# Patient Record
Sex: FEMALE | Race: WHITE | NOT HISPANIC OR LATINO | Employment: OTHER | URBAN - METROPOLITAN AREA
[De-identification: names, ages, dates, MRNs, and addresses within clinical notes are randomized per-mention and may not be internally consistent; named-entity substitution may affect disease eponyms.]

---

## 2023-04-30 PROBLEM — M81.8 IDIOPATHIC OSTEOPOROSIS: Status: ACTIVE | Noted: 2023-04-30

## 2023-04-30 PROBLEM — S22.080A COMPRESSION FRACTURE OF T11 VERTEBRA (MULTI): Status: ACTIVE | Noted: 2023-04-30

## 2023-04-30 PROBLEM — H91.90 HEARING LOSS: Status: ACTIVE | Noted: 2023-04-30

## 2023-04-30 PROBLEM — H25.813 COMBINED FORM OF SENILE CATARACT OF BOTH EYES: Status: ACTIVE | Noted: 2023-04-30

## 2023-04-30 PROBLEM — I10 BENIGN ESSENTIAL HYPERTENSION: Status: ACTIVE | Noted: 2023-04-30

## 2023-04-30 PROBLEM — M48.061 LUMBAR STENOSIS: Status: ACTIVE | Noted: 2023-04-30

## 2023-04-30 PROBLEM — M16.12 PRIMARY OSTEOARTHRITIS OF LEFT HIP: Status: ACTIVE | Noted: 2023-04-30

## 2023-04-30 PROBLEM — S72.143A CLOSED INTERTROCHANTERIC FRACTURE OF FEMUR (MULTI): Status: ACTIVE | Noted: 2023-04-30

## 2023-04-30 PROBLEM — M85.80 LOW BONE MASS: Status: ACTIVE | Noted: 2023-04-30

## 2023-04-30 PROBLEM — M17.9 DJD (DEGENERATIVE JOINT DISEASE) OF KNEE: Status: ACTIVE | Noted: 2023-04-30

## 2023-04-30 PROBLEM — E78.5 HYPERLIPIDEMIA: Status: ACTIVE | Noted: 2023-04-30

## 2023-04-30 PROBLEM — K57.92 DIVERTICULITIS OF INTESTINE WITHOUT PERFORATION OR ABSCESS WITHOUT BLEEDING: Status: ACTIVE | Noted: 2023-04-30

## 2023-04-30 PROBLEM — K58.9 IRRITABLE BOWEL SYNDROME: Status: ACTIVE | Noted: 2023-04-30

## 2023-04-30 RX ORDER — TRIAMTERENE AND HYDROCHLOROTHIAZIDE 75; 50 MG/1; MG/1
1 TABLET ORAL DAILY
COMMUNITY
Start: 2022-07-19 | End: 2023-10-30

## 2023-04-30 RX ORDER — CALCIUM CARBONATE/VITAMIN D3 500 MG-10
TABLET,CHEWABLE ORAL
COMMUNITY
End: 2024-04-17 | Stop reason: ALTCHOICE

## 2023-04-30 RX ORDER — ATORVASTATIN CALCIUM 10 MG/1
10 TABLET, FILM COATED ORAL DAILY
COMMUNITY
Start: 2013-11-26 | End: 2023-12-29

## 2023-04-30 RX ORDER — OMEPRAZOLE 40 MG/1
1 CAPSULE, DELAYED RELEASE ORAL DAILY
COMMUNITY
Start: 2021-07-15 | End: 2024-02-13

## 2023-04-30 RX ORDER — METOPROLOL SUCCINATE 25 MG/1
12.5 TABLET, EXTENDED RELEASE ORAL DAILY
COMMUNITY
Start: 2022-05-22 | End: 2023-11-20 | Stop reason: SDUPTHER

## 2023-04-30 RX ORDER — ASPIRIN 81 MG/1
81 TABLET ORAL DAILY
COMMUNITY
Start: 2022-04-06 | End: 2023-11-01 | Stop reason: SDUPTHER

## 2023-04-30 RX ORDER — LOSARTAN POTASSIUM 25 MG/1
25 TABLET ORAL DAILY
COMMUNITY
Start: 2017-05-09 | End: 2023-12-29

## 2023-04-30 RX ORDER — ALENDRONATE SODIUM 70 MG/1
70 TABLET ORAL WEEKLY
COMMUNITY
Start: 2022-07-26 | End: 2023-08-19 | Stop reason: SDUPTHER

## 2023-05-01 ENCOUNTER — OFFICE VISIT (OUTPATIENT)
Dept: PRIMARY CARE | Facility: CLINIC | Age: 83
End: 2023-05-01
Payer: MEDICARE

## 2023-05-01 VITALS
DIASTOLIC BLOOD PRESSURE: 84 MMHG | WEIGHT: 160 LBS | SYSTOLIC BLOOD PRESSURE: 136 MMHG | BODY MASS INDEX: 32.32 KG/M2 | HEART RATE: 72 BPM

## 2023-05-01 DIAGNOSIS — Z12.31 SCREENING MAMMOGRAM, ENCOUNTER FOR: ICD-10-CM

## 2023-05-01 DIAGNOSIS — M81.8 IDIOPATHIC OSTEOPOROSIS: ICD-10-CM

## 2023-05-01 DIAGNOSIS — R53.81 MALAISE: ICD-10-CM

## 2023-05-01 DIAGNOSIS — K58.8 OTHER IRRITABLE BOWEL SYNDROME: ICD-10-CM

## 2023-05-01 DIAGNOSIS — E78.2 MIXED HYPERLIPIDEMIA: ICD-10-CM

## 2023-05-01 DIAGNOSIS — I10 BENIGN ESSENTIAL HYPERTENSION: ICD-10-CM

## 2023-05-01 DIAGNOSIS — I27.20 PULMONARY HYPERTENSION (MULTI): ICD-10-CM

## 2023-05-01 DIAGNOSIS — E55.9 VITAMIN D DEFICIENCY: ICD-10-CM

## 2023-05-01 DIAGNOSIS — M48.061 SPINAL STENOSIS OF LUMBAR REGION WITHOUT NEUROGENIC CLAUDICATION: Primary | ICD-10-CM

## 2023-05-01 PROCEDURE — 3079F DIAST BP 80-89 MM HG: CPT | Performed by: INTERNAL MEDICINE

## 2023-05-01 PROCEDURE — 99213 OFFICE O/P EST LOW 20 MIN: CPT | Performed by: INTERNAL MEDICINE

## 2023-05-01 PROCEDURE — 3075F SYST BP GE 130 - 139MM HG: CPT | Performed by: INTERNAL MEDICINE

## 2023-05-01 PROCEDURE — 1160F RVW MEDS BY RX/DR IN RCRD: CPT | Performed by: INTERNAL MEDICINE

## 2023-05-01 PROCEDURE — 1036F TOBACCO NON-USER: CPT | Performed by: INTERNAL MEDICINE

## 2023-05-01 PROCEDURE — 1159F MED LIST DOCD IN RCRD: CPT | Performed by: INTERNAL MEDICINE

## 2023-05-01 RX ORDER — MELOXICAM 7.5 MG/1
7.5 TABLET ORAL DAILY
COMMUNITY
Start: 2023-04-18 | End: 2023-05-01 | Stop reason: SDUPTHER

## 2023-05-01 RX ORDER — CHOLECALCIFEROL (VITAMIN D3) 25 MCG
50 TABLET ORAL DAILY
COMMUNITY
Start: 2021-12-31

## 2023-05-01 RX ORDER — MELOXICAM 7.5 MG/1
7.5 TABLET ORAL DAILY
Qty: 30 TABLET | Refills: 3 | Status: SHIPPED | OUTPATIENT
Start: 2023-05-01 | End: 2024-02-02 | Stop reason: SDUPTHER

## 2023-05-01 SDOH — HEALTH STABILITY: PHYSICAL HEALTH: ON AVERAGE, HOW MANY DAYS PER WEEK DO YOU ENGAGE IN MODERATE TO STRENUOUS EXERCISE (LIKE A BRISK WALK)?: 7 DAYS

## 2023-05-01 SDOH — HEALTH STABILITY: PHYSICAL HEALTH: ON AVERAGE, HOW MANY MINUTES DO YOU ENGAGE IN EXERCISE AT THIS LEVEL?: 20 MIN

## 2023-05-01 ASSESSMENT — LIFESTYLE VARIABLES
HOW OFTEN DO YOU HAVE SIX OR MORE DRINKS ON ONE OCCASION: NEVER
HOW MANY STANDARD DRINKS CONTAINING ALCOHOL DO YOU HAVE ON A TYPICAL DAY: 1 OR 2
AUDIT-C TOTAL SCORE: 2
SKIP TO QUESTIONS 9-10: 1
HOW OFTEN DO YOU HAVE A DRINK CONTAINING ALCOHOL: 2-4 TIMES A MONTH

## 2023-05-01 ASSESSMENT — PATIENT HEALTH QUESTIONNAIRE - PHQ9
SUM OF ALL RESPONSES TO PHQ9 QUESTIONS 1 & 2: 0
1. LITTLE INTEREST OR PLEASURE IN DOING THINGS: NOT AT ALL
2. FEELING DOWN, DEPRESSED OR HOPELESS: NOT AT ALL

## 2023-05-01 ASSESSMENT — ENCOUNTER SYMPTOMS
BACK PAIN: 1
ARTHRALGIAS: 1

## 2023-05-01 NOTE — PROGRESS NOTES
Subjective   Patient ID: Shila Venegas is a 82 y.o. female who presents for Medicare Annual Wellness Visit Subsequent.    Patient presents for follow-up.  She has been compliant with her medications, and exercise.  Patient reports baseline back and knee pain.  She was treated for UTI.  She denies any headaches, no dizziness, no sinus problems, no chest pain or shortness of breath.  She denies abdominal pain no nausea vomiting or diarrhea.  She reports no other new musculoskeletal complaints.         Review of Systems   Musculoskeletal:  Positive for arthralgias and back pain.       Objective   /84   Pulse 72   Wt 72.6 kg (160 lb)   BMI 32.32 kg/m²     Physical Exam  Constitutional:       Appearance: Normal appearance.   Cardiovascular:      Rate and Rhythm: Normal rate and regular rhythm.      Heart sounds: No murmur heard.     No gallop.   Pulmonary:      Effort: No respiratory distress.      Breath sounds: No wheezing or rales.   Abdominal:      General: There is no distension.      Palpations: There is no mass.      Tenderness: There is no abdominal tenderness. There is no guarding.   Musculoskeletal:      Right lower leg: No edema.      Left lower leg: No edema.   Neurological:      Mental Status: She is alert.         Assessment/Plan   Diagnoses and all orders for this visit:  Spinal stenosis of lumbar region without neurogenic claudication-stable symptoms-  Benign essential hypertension she will follow a low-salt diet and exercise  -     Uric Acid; Future  -     Urinalysis with Reflex Microscopic; Future  -     Albumin , Urine Random; Future  -     Comprehensive Metabolic Panel; Future  Other irritable bowel syndrome  Idiopathic osteoporosis-we will continue with Fosamax-we will check a fast lipid profile.  Mixed hyperlipidemia  -     Lipid Panel; Future  Pulmonary hypertension (CMS/HCC)-she denies any symptoms  Health maintenance-colonoscopy has been done.  Malaise  -     CBC and Auto Differential;  Future  -     TSH with reflex to Free T4 if abnormal; Future  Vitamin D deficiency  -     Vitamin D, Total; Future  Screening mammogram, encounter for  -     BI mammo bilateral screening tomosynthesis; Future  Health maintenance-colonoscopy has been done.  Immunizations are up-to-date.  Bone density has been done.  She will schedule her mammogram

## 2023-05-01 NOTE — PATIENT INSTRUCTIONS
Please schedule mammogram.  Obtain fasting blood work and urine.  Diet and exercise.  Follow-up in 3 months.

## 2023-06-06 ENCOUNTER — LAB (OUTPATIENT)
Dept: LAB | Facility: LAB | Age: 83
End: 2023-06-06
Payer: MEDICARE

## 2023-06-06 DIAGNOSIS — E78.2 MIXED HYPERLIPIDEMIA: ICD-10-CM

## 2023-06-06 DIAGNOSIS — I10 BENIGN ESSENTIAL HYPERTENSION: ICD-10-CM

## 2023-06-06 DIAGNOSIS — E55.9 VITAMIN D DEFICIENCY: ICD-10-CM

## 2023-06-06 DIAGNOSIS — R53.81 MALAISE: ICD-10-CM

## 2023-06-06 LAB
ALANINE AMINOTRANSFERASE (SGPT) (U/L) IN SER/PLAS: 8 U/L (ref 7–45)
ALBUMIN (G/DL) IN SER/PLAS: 4.2 G/DL (ref 3.4–5)
ALBUMIN (MG/L) IN URINE: 10.8 MG/L
ALBUMIN/CREATININE (UG/MG) IN URINE: 11.7 UG/MG CRT (ref 0–30)
ALKALINE PHOSPHATASE (U/L) IN SER/PLAS: 77 U/L (ref 33–136)
ANION GAP IN SER/PLAS: 14 MMOL/L (ref 10–20)
APPEARANCE, URINE: CLEAR
ASPARTATE AMINOTRANSFERASE (SGOT) (U/L) IN SER/PLAS: 13 U/L (ref 9–39)
BASOPHILS (10*3/UL) IN BLOOD BY AUTOMATED COUNT: 0.04 X10E9/L (ref 0–0.1)
BASOPHILS/100 LEUKOCYTES IN BLOOD BY AUTOMATED COUNT: 0.6 % (ref 0–2)
BILIRUBIN TOTAL (MG/DL) IN SER/PLAS: 0.7 MG/DL (ref 0–1.2)
BILIRUBIN, URINE: NEGATIVE
BLOOD, URINE: NEGATIVE
CALCIDIOL (25 OH VITAMIN D3) (NG/ML) IN SER/PLAS: 43 NG/ML
CALCIUM (MG/DL) IN SER/PLAS: 9.6 MG/DL (ref 8.6–10.6)
CARBON DIOXIDE, TOTAL (MMOL/L) IN SER/PLAS: 27 MMOL/L (ref 21–32)
CHLORIDE (MMOL/L) IN SER/PLAS: 106 MMOL/L (ref 98–107)
CHOLESTEROL (MG/DL) IN SER/PLAS: 152 MG/DL (ref 0–199)
CHOLESTEROL IN HDL (MG/DL) IN SER/PLAS: 60.7 MG/DL
CHOLESTEROL/HDL RATIO: 2.5
COLOR, URINE: YELLOW
CREATININE (MG/DL) IN SER/PLAS: 1.08 MG/DL (ref 0.5–1.05)
CREATININE (MG/DL) IN URINE: 92.2 MG/DL (ref 20–320)
EOSINOPHILS (10*3/UL) IN BLOOD BY AUTOMATED COUNT: 0.13 X10E9/L (ref 0–0.4)
EOSINOPHILS/100 LEUKOCYTES IN BLOOD BY AUTOMATED COUNT: 2.1 % (ref 0–6)
ERYTHROCYTE DISTRIBUTION WIDTH (RATIO) BY AUTOMATED COUNT: 15.1 % (ref 11.5–14.5)
ERYTHROCYTE MEAN CORPUSCULAR HEMOGLOBIN CONCENTRATION (G/DL) BY AUTOMATED: 31.2 G/DL (ref 32–36)
ERYTHROCYTE MEAN CORPUSCULAR VOLUME (FL) BY AUTOMATED COUNT: 91 FL (ref 80–100)
ERYTHROCYTES (10*6/UL) IN BLOOD BY AUTOMATED COUNT: 4.32 X10E12/L (ref 4–5.2)
GFR FEMALE: 51 ML/MIN/1.73M2
GLUCOSE (MG/DL) IN SER/PLAS: 87 MG/DL (ref 74–99)
GLUCOSE, URINE: NEGATIVE MG/DL
HEMATOCRIT (%) IN BLOOD BY AUTOMATED COUNT: 39.4 % (ref 36–46)
HEMOGLOBIN (G/DL) IN BLOOD: 12.3 G/DL (ref 12–16)
IMMATURE GRANULOCYTES/100 LEUKOCYTES IN BLOOD BY AUTOMATED COUNT: 0.6 % (ref 0–0.9)
KETONES, URINE: NEGATIVE MG/DL
LDL: 75 MG/DL (ref 0–99)
LEUKOCYTE ESTERASE, URINE: ABNORMAL
LEUKOCYTES (10*3/UL) IN BLOOD BY AUTOMATED COUNT: 6.3 X10E9/L (ref 4.4–11.3)
LYMPHOCYTES (10*3/UL) IN BLOOD BY AUTOMATED COUNT: 0.53 X10E9/L (ref 0.8–3)
LYMPHOCYTES/100 LEUKOCYTES IN BLOOD BY AUTOMATED COUNT: 8.4 % (ref 13–44)
MONOCYTES (10*3/UL) IN BLOOD BY AUTOMATED COUNT: 0.38 X10E9/L (ref 0.05–0.8)
MONOCYTES/100 LEUKOCYTES IN BLOOD BY AUTOMATED COUNT: 6 % (ref 2–10)
MUCUS, URINE: NORMAL /LPF
NEUTROPHILS (10*3/UL) IN BLOOD BY AUTOMATED COUNT: 5.18 X10E9/L (ref 1.6–5.5)
NEUTROPHILS/100 LEUKOCYTES IN BLOOD BY AUTOMATED COUNT: 82.3 % (ref 40–80)
NITRITE, URINE: NEGATIVE
NRBC (PER 100 WBCS) BY AUTOMATED COUNT: 0 /100 WBC (ref 0–0)
PH, URINE: 6 (ref 5–8)
PLATELETS (10*3/UL) IN BLOOD AUTOMATED COUNT: 205 X10E9/L (ref 150–450)
POTASSIUM (MMOL/L) IN SER/PLAS: 4.1 MMOL/L (ref 3.5–5.3)
PROTEIN TOTAL: 6.9 G/DL (ref 6.4–8.2)
PROTEIN, URINE: NEGATIVE MG/DL
RBC, URINE: 1 /HPF (ref 0–5)
SODIUM (MMOL/L) IN SER/PLAS: 143 MMOL/L (ref 136–145)
SPECIFIC GRAVITY, URINE: 1.01 (ref 1–1.03)
SQUAMOUS EPITHELIAL CELLS, URINE: 7 /HPF
THYROTROPIN (MIU/L) IN SER/PLAS BY DETECTION LIMIT <= 0.05 MIU/L: 0.98 MIU/L (ref 0.44–3.98)
TRIGLYCERIDE (MG/DL) IN SER/PLAS: 82 MG/DL (ref 0–149)
URATE (MG/DL) IN SER/PLAS: 7.4 MG/DL (ref 2.3–6.7)
UREA NITROGEN (MG/DL) IN SER/PLAS: 31 MG/DL (ref 6–23)
UROBILINOGEN, URINE: <2 MG/DL (ref 0–1.9)
VLDL: 16 MG/DL (ref 0–40)
WBC, URINE: 2 /HPF (ref 0–5)

## 2023-06-06 PROCEDURE — 82043 UR ALBUMIN QUANTITATIVE: CPT

## 2023-06-06 PROCEDURE — 85025 COMPLETE CBC W/AUTO DIFF WBC: CPT

## 2023-06-06 PROCEDURE — 80061 LIPID PANEL: CPT

## 2023-06-06 PROCEDURE — 36415 COLL VENOUS BLD VENIPUNCTURE: CPT

## 2023-06-06 PROCEDURE — 84550 ASSAY OF BLOOD/URIC ACID: CPT

## 2023-06-06 PROCEDURE — 84443 ASSAY THYROID STIM HORMONE: CPT

## 2023-06-06 PROCEDURE — 81001 URINALYSIS AUTO W/SCOPE: CPT

## 2023-06-06 PROCEDURE — 80053 COMPREHEN METABOLIC PANEL: CPT

## 2023-06-06 PROCEDURE — 82306 VITAMIN D 25 HYDROXY: CPT

## 2023-06-06 PROCEDURE — 82570 ASSAY OF URINE CREATININE: CPT

## 2023-06-07 RX ORDER — ASPIRIN 81 MG/1
TABLET ORAL
COMMUNITY
Start: 2022-02-02

## 2023-06-07 RX ORDER — LOSARTAN POTASSIUM 50 MG/1
1 TABLET ORAL DAILY
COMMUNITY
Start: 2021-12-31 | End: 2023-11-20 | Stop reason: SDUPTHER

## 2023-06-07 RX ORDER — LISINOPRIL 20 MG/1
TABLET ORAL
COMMUNITY
Start: 2005-01-19 | End: 2023-11-01 | Stop reason: ALTCHOICE

## 2023-06-07 RX ORDER — GLUCOSAMINE/CHONDROITIN/C/MANG 500-400 MG
CAPSULE ORAL
COMMUNITY
Start: 2005-04-26 | End: 2023-11-01 | Stop reason: ALTCHOICE

## 2023-06-07 RX ORDER — OMEPRAZOLE 40 MG/1
40 CAPSULE, DELAYED RELEASE ORAL
COMMUNITY
Start: 2021-12-06 | End: 2023-11-01 | Stop reason: SDUPTHER

## 2023-06-07 RX ORDER — METOPROLOL SUCCINATE 25 MG/1
12.5 TABLET, EXTENDED RELEASE ORAL DAILY
COMMUNITY
End: 2023-11-01 | Stop reason: SDUPTHER

## 2023-06-07 RX ORDER — ATENOLOL 25 MG/1
TABLET ORAL
COMMUNITY
Start: 2005-01-19 | End: 2023-11-20 | Stop reason: WASHOUT

## 2023-06-07 RX ORDER — ATORVASTATIN CALCIUM 10 MG/1
10 TABLET, FILM COATED ORAL NIGHTLY
COMMUNITY
Start: 2021-05-11 | End: 2023-11-01 | Stop reason: SDUPTHER

## 2023-06-07 RX ORDER — RISEDRONATE SODIUM 35 MG/1
TABLET, FILM COATED ORAL
COMMUNITY
Start: 2005-04-26 | End: 2023-11-20 | Stop reason: WASHOUT

## 2023-06-07 RX ORDER — ACETAMINOPHEN 500 MG
TABLET ORAL
COMMUNITY
Start: 2022-02-02

## 2023-06-07 RX ORDER — METOPROLOL SUCCINATE 25 MG/1
TABLET, EXTENDED RELEASE ORAL
COMMUNITY
Start: 2021-11-10 | End: 2023-12-29

## 2023-06-07 RX ORDER — CIPROFLOXACIN 250 MG/1
TABLET, FILM COATED ORAL
COMMUNITY
Start: 2022-07-20 | End: 2023-06-08 | Stop reason: ALTCHOICE

## 2023-06-08 ENCOUNTER — OFFICE VISIT (OUTPATIENT)
Dept: PRIMARY CARE | Facility: CLINIC | Age: 83
End: 2023-06-08
Payer: MEDICARE

## 2023-06-08 VITALS
BODY MASS INDEX: 32.11 KG/M2 | SYSTOLIC BLOOD PRESSURE: 151 MMHG | WEIGHT: 159 LBS | TEMPERATURE: 97.2 F | DIASTOLIC BLOOD PRESSURE: 91 MMHG

## 2023-06-08 DIAGNOSIS — R59.0 SUBMENTAL LYMPHADENOPATHY: ICD-10-CM

## 2023-06-08 DIAGNOSIS — I27.20 PULMONARY HYPERTENSION (MULTI): ICD-10-CM

## 2023-06-08 DIAGNOSIS — I10 BENIGN ESSENTIAL HYPERTENSION: Primary | ICD-10-CM

## 2023-06-08 PROCEDURE — 3077F SYST BP >= 140 MM HG: CPT | Performed by: INTERNAL MEDICINE

## 2023-06-08 PROCEDURE — 1036F TOBACCO NON-USER: CPT | Performed by: INTERNAL MEDICINE

## 2023-06-08 PROCEDURE — 1160F RVW MEDS BY RX/DR IN RCRD: CPT | Performed by: INTERNAL MEDICINE

## 2023-06-08 PROCEDURE — 99212 OFFICE O/P EST SF 10 MIN: CPT | Performed by: INTERNAL MEDICINE

## 2023-06-08 PROCEDURE — 3080F DIAST BP >= 90 MM HG: CPT | Performed by: INTERNAL MEDICINE

## 2023-06-08 PROCEDURE — 1159F MED LIST DOCD IN RCRD: CPT | Performed by: INTERNAL MEDICINE

## 2023-06-08 ASSESSMENT — ENCOUNTER SYMPTOMS
HEADACHES: 0
CHILLS: 0
NERVOUS/ANXIOUS: 0
WHEEZING: 0
BLOOD IN STOOL: 0
ACTIVITY CHANGE: 0
LIGHT-HEADEDNESS: 0
DECREASED CONCENTRATION: 0
BACK PAIN: 0
WEAKNESS: 0
ARTHRALGIAS: 0
UNEXPECTED WEIGHT CHANGE: 0
SORE THROAT: 0
SHORTNESS OF BREATH: 0
DYSURIA: 0
FREQUENCY: 0
ABDOMINAL PAIN: 0
APPETITE CHANGE: 0
DIFFICULTY URINATING: 0
SLEEP DISTURBANCE: 0
PALPITATIONS: 0
TROUBLE SWALLOWING: 0
CHEST TIGHTNESS: 0
DIZZINESS: 0
NECK PAIN: 0
VOICE CHANGE: 0
COUGH: 0
FLANK PAIN: 0

## 2023-06-08 NOTE — PROGRESS NOTES
Subjective   Patient ID: Shila Venegas is a 82 y.o. female.    HPI patient is seen today for complaints of swelling in her neck for last few weeks.  She denies any pain.  She denies any upper respiratory symptoms.  No fever or chills.    Review of Systems   Constitutional:  Negative for activity change, appetite change, chills and unexpected weight change.   HENT:  Negative for congestion, postnasal drip, sore throat, trouble swallowing and voice change.    Eyes:  Negative for visual disturbance.   Respiratory:  Negative for cough, chest tightness, shortness of breath and wheezing.    Cardiovascular:  Negative for chest pain, palpitations and leg swelling.   Gastrointestinal:  Negative for abdominal pain and blood in stool.   Endocrine: Negative for cold intolerance and heat intolerance.   Genitourinary:  Negative for difficulty urinating, dysuria, flank pain and frequency.   Musculoskeletal:  Negative for arthralgias, back pain, gait problem and neck pain.   Skin:  Negative for rash.   Allergic/Immunologic: Negative for environmental allergies and food allergies.   Neurological:  Negative for dizziness, weakness, light-headedness and headaches.   Psychiatric/Behavioral:  Negative for decreased concentration and sleep disturbance. The patient is not nervous/anxious.    Patient is seen today for complaints of swelling in her neck on the right side    Objective   Physical Exam  Vitals reviewed.   Constitutional:       General: She is not in acute distress.     Appearance: Normal appearance.   HENT:      Head: Normocephalic and atraumatic.      Mouth/Throat:      Mouth: Mucous membranes are moist.   Eyes:      Extraocular Movements: Extraocular movements intact.      Conjunctiva/sclera: Conjunctivae normal.      Pupils: Pupils are equal, round, and reactive to light.   Cardiovascular:      Rate and Rhythm: Normal rate and regular rhythm.      Pulses: Normal pulses.   Pulmonary:      Effort: Pulmonary effort is normal.  No respiratory distress.      Breath sounds: Normal breath sounds.   Abdominal:      General: Bowel sounds are normal. There is no distension.      Tenderness: There is no abdominal tenderness.   Musculoskeletal:         General: No swelling or tenderness. Normal range of motion.      Cervical back: Normal range of motion.   Skin:     General: Skin is warm.   Neurological:      General: No focal deficit present.      Mental Status: She is alert.      Coordination: Coordination normal.      Gait: Gait normal.   Psychiatric:         Mood and Affect: Mood normal.         Behavior: Behavior normal.         Assessment/Plan   There are no diagnoses linked to this encounter.

## 2023-07-18 DIAGNOSIS — M17.9 OSTEOARTHRITIS OF KNEE, UNSPECIFIED LATERALITY, UNSPECIFIED OSTEOARTHRITIS TYPE: ICD-10-CM

## 2023-07-24 ENCOUNTER — OFFICE VISIT (OUTPATIENT)
Dept: PRIMARY CARE | Facility: CLINIC | Age: 83
End: 2023-07-24
Payer: MEDICARE

## 2023-07-24 VITALS
SYSTOLIC BLOOD PRESSURE: 130 MMHG | WEIGHT: 164 LBS | DIASTOLIC BLOOD PRESSURE: 80 MMHG | HEART RATE: 72 BPM | BODY MASS INDEX: 33.12 KG/M2

## 2023-07-24 DIAGNOSIS — M48.061 SPINAL STENOSIS OF LUMBAR REGION WITHOUT NEUROGENIC CLAUDICATION: ICD-10-CM

## 2023-07-24 DIAGNOSIS — M17.9 OSTEOARTHRITIS OF KNEE, UNSPECIFIED LATERALITY, UNSPECIFIED OSTEOARTHRITIS TYPE: ICD-10-CM

## 2023-07-24 DIAGNOSIS — E78.2 MIXED HYPERLIPIDEMIA: ICD-10-CM

## 2023-07-24 DIAGNOSIS — M81.8 IDIOPATHIC OSTEOPOROSIS: ICD-10-CM

## 2023-07-24 DIAGNOSIS — I10 BENIGN ESSENTIAL HYPERTENSION: ICD-10-CM

## 2023-07-24 DIAGNOSIS — I27.20 PULMONARY HYPERTENSION (MULTI): Primary | ICD-10-CM

## 2023-07-24 PROCEDURE — 1160F RVW MEDS BY RX/DR IN RCRD: CPT | Performed by: INTERNAL MEDICINE

## 2023-07-24 PROCEDURE — 1126F AMNT PAIN NOTED NONE PRSNT: CPT | Performed by: INTERNAL MEDICINE

## 2023-07-24 PROCEDURE — 1159F MED LIST DOCD IN RCRD: CPT | Performed by: INTERNAL MEDICINE

## 2023-07-24 PROCEDURE — 3079F DIAST BP 80-89 MM HG: CPT | Performed by: INTERNAL MEDICINE

## 2023-07-24 PROCEDURE — 1170F FXNL STATUS ASSESSED: CPT | Performed by: INTERNAL MEDICINE

## 2023-07-24 PROCEDURE — 99214 OFFICE O/P EST MOD 30 MIN: CPT | Performed by: INTERNAL MEDICINE

## 2023-07-24 PROCEDURE — 3075F SYST BP GE 130 - 139MM HG: CPT | Performed by: INTERNAL MEDICINE

## 2023-07-24 PROCEDURE — G0439 PPPS, SUBSEQ VISIT: HCPCS | Performed by: INTERNAL MEDICINE

## 2023-07-24 PROCEDURE — 1036F TOBACCO NON-USER: CPT | Performed by: INTERNAL MEDICINE

## 2023-07-24 ASSESSMENT — ENCOUNTER SYMPTOMS
EYE ITCHING: 0
COUGH: 0
NECK STIFFNESS: 0
WOUND: 0
SORE THROAT: 0
EYE DISCHARGE: 0
PHOTOPHOBIA: 0
PALPITATIONS: 0
EYE REDNESS: 0
ACTIVITY CHANGE: 0
FLANK PAIN: 0
CHOKING: 0
BACK PAIN: 1
NAUSEA: 0
RHINORRHEA: 0
SLEEP DISTURBANCE: 0
SINUS PAIN: 0
NUMBNESS: 0
ANAL BLEEDING: 0
APPETITE CHANGE: 0
RECTAL PAIN: 0
DIAPHORESIS: 0
TREMORS: 0
ADENOPATHY: 0
MYALGIAS: 0
FACIAL ASYMMETRY: 0
VOICE CHANGE: 0
DIARRHEA: 0
ARTHRALGIAS: 1
VOMITING: 0
EYE PAIN: 0
TROUBLE SWALLOWING: 0
LIGHT-HEADEDNESS: 0
ABDOMINAL DISTENTION: 0
DIFFICULTY URINATING: 0
CHEST TIGHTNESS: 0
COLOR CHANGE: 0
CONSTIPATION: 0
ABDOMINAL PAIN: 0
CHILLS: 0
FATIGUE: 0
DIZZINESS: 0
SHORTNESS OF BREATH: 0
SPEECH DIFFICULTY: 0
WEAKNESS: 0
HEMATURIA: 0
STRIDOR: 0
POLYDIPSIA: 0
FACIAL SWELLING: 0
WHEEZING: 0
JOINT SWELLING: 0
BLOOD IN STOOL: 0
SINUS PRESSURE: 0
FREQUENCY: 0
DYSURIA: 0
NECK PAIN: 0
POLYPHAGIA: 0
SEIZURES: 0
HEADACHES: 0
BRUISES/BLEEDS EASILY: 0

## 2023-07-24 ASSESSMENT — ACTIVITIES OF DAILY LIVING (ADL)
MANAGING_FINANCES: INDEPENDENT
DRESSING: INDEPENDENT
DOING_HOUSEWORK: INDEPENDENT
TAKING_MEDICATION: INDEPENDENT
GROCERY_SHOPPING: INDEPENDENT
BATHING: INDEPENDENT

## 2023-07-24 ASSESSMENT — PATIENT HEALTH QUESTIONNAIRE - PHQ9
2. FEELING DOWN, DEPRESSED OR HOPELESS: NOT AT ALL
SUM OF ALL RESPONSES TO PHQ9 QUESTIONS 1 AND 2: 0
1. LITTLE INTEREST OR PLEASURE IN DOING THINGS: NOT AT ALL

## 2023-07-24 NOTE — PROGRESS NOTES
Subjective   Patient ID: Shila Venegas is a 83 y.o. female who presents for Follow-up.    Patient presents for  wellness exam and follow-up.  She has been compliant with her medications, diet and exercise.  She reports resolution of her neck lump.  She does have some neck pain.  She denies any headaches, no dizziness, no sinus problems, no chest pain or shortness of breath.  Denies abdominal pain no nausea vomiting or diarrhea.  She reports baseline knee and back pain.         Review of Systems   Constitutional:  Negative for activity change, appetite change, chills, diaphoresis and fatigue.   HENT:  Negative for congestion, dental problem, drooling, ear discharge, ear pain, facial swelling, hearing loss, mouth sores, nosebleeds, postnasal drip, rhinorrhea, sinus pressure, sinus pain, sneezing, sore throat, tinnitus, trouble swallowing and voice change.    Eyes:  Negative for photophobia, pain, discharge, redness, itching and visual disturbance.   Respiratory:  Negative for cough, choking, chest tightness, shortness of breath, wheezing and stridor.    Cardiovascular:  Negative for chest pain, palpitations and leg swelling.   Gastrointestinal:  Negative for abdominal distention, abdominal pain, anal bleeding, blood in stool, constipation, diarrhea, nausea, rectal pain and vomiting.   Endocrine: Negative for cold intolerance, heat intolerance, polydipsia, polyphagia and polyuria.   Genitourinary:  Negative for decreased urine volume, difficulty urinating, dysuria, enuresis, flank pain, frequency, genital sores, hematuria and urgency.   Musculoskeletal:  Positive for arthralgias and back pain. Negative for gait problem, joint swelling, myalgias, neck pain and neck stiffness.   Skin:  Negative for color change, pallor, rash and wound.   Neurological:  Negative for dizziness, tremors, seizures, syncope, facial asymmetry, speech difficulty, weakness, light-headedness, numbness and headaches.   Hematological:  Negative for  adenopathy. Does not bruise/bleed easily.   Psychiatric/Behavioral:  Negative for sleep disturbance.        Objective   Wt 74.4 kg (164 lb)   BMI 33.12 kg/m²     Physical Exam  Constitutional:       Appearance: Normal appearance.   Cardiovascular:      Rate and Rhythm: Normal rate and regular rhythm.      Heart sounds: No murmur heard.     No gallop.   Pulmonary:      Effort: No respiratory distress.      Breath sounds: No wheezing or rales.   Abdominal:      General: There is no distension.      Palpations: There is no mass.      Tenderness: There is no abdominal tenderness. There is no guarding.   Musculoskeletal:      Right lower leg: Edema (trace edema) present.      Left lower leg: Edema (Trace edema) present.   Neurological:      Mental Status: She is alert.         Assessment/Plan   Diagnoses and all orders for this visit:  Pulmonary hypertension (CMS/HCC)-she is asymptomatic.  Mixed hyperlipidemia-we will continue with statin  Benign essential hypertension-we will continue with present management.  Blood pressure okay.  Idiopathic osteoporosis-she will take medication as prescribed.  Exercise.  Take calcium and vitamin D  Spinal stenosis of lumbar region without neurogenic claudication-stable symptoms  Osteoarthritis of knee, unspecified laterality, unspecified osteoarthritis type-stable symptoms.  Health maintenance-colonoscopy has been done.  Mammogram is up-to-date.  Bone density has been done.  Get a tetanus shot at the pharmacy

## 2023-08-09 DIAGNOSIS — M85.80 LOW BONE MASS: ICD-10-CM

## 2023-08-09 RX ORDER — ALENDRONATE SODIUM 70 MG/1
70 TABLET ORAL
Qty: 90 TABLET | Refills: 1 | Status: SHIPPED | OUTPATIENT
Start: 2023-08-09 | End: 2023-11-20 | Stop reason: ALTCHOICE

## 2023-08-19 RX ORDER — ALENDRONATE SODIUM 70 MG/1
TABLET ORAL
Qty: 12 TABLET | Refills: 1 | Status: SHIPPED | OUTPATIENT
Start: 2023-08-19 | End: 2023-11-20 | Stop reason: ALTCHOICE

## 2023-08-24 DIAGNOSIS — M81.8 IDIOPATHIC OSTEOPOROSIS: ICD-10-CM

## 2023-08-25 RX ORDER — ALENDRONATE SODIUM 70 MG/1
70 TABLET ORAL
Qty: 12 TABLET | Refills: 3 | Status: SHIPPED | OUTPATIENT
Start: 2023-08-25 | End: 2023-11-20 | Stop reason: ALTCHOICE

## 2023-09-16 PROBLEM — M25.579 ANKLE JOINT PAIN: Status: ACTIVE | Noted: 2023-09-16

## 2023-09-16 PROBLEM — R30.0 DYSURIA: Status: ACTIVE | Noted: 2023-09-16

## 2023-09-16 PROBLEM — M21.70 LEG LENGTH DISCREPANCY: Status: ACTIVE | Noted: 2023-09-16

## 2023-09-16 PROBLEM — H52.13 MYOPIA OF BOTH EYES: Status: ACTIVE | Noted: 2023-09-16

## 2023-09-16 PROBLEM — H61.21 IMPACTED CERUMEN OF RIGHT EAR: Status: ACTIVE | Noted: 2023-09-16

## 2023-09-16 PROBLEM — M76.829 POSTERIOR TIBIAL TENDON DYSFUNCTION: Status: ACTIVE | Noted: 2023-09-16

## 2023-09-16 PROBLEM — M47.816 LUMBAR SPONDYLOSIS: Status: ACTIVE | Noted: 2023-09-16

## 2023-09-16 PROBLEM — N28.1 BILATERAL RENAL CYSTS: Status: ACTIVE | Noted: 2021-12-27

## 2023-09-16 PROBLEM — R53.81 MALAISE: Status: ACTIVE | Noted: 2023-09-16

## 2023-09-16 PROBLEM — H90.3 SENSORINEURAL HEARING LOSS (SNHL) OF BOTH EARS: Status: ACTIVE | Noted: 2023-09-16

## 2023-09-16 PROBLEM — H52.11 MYOPIA OF RIGHT EYE: Status: ACTIVE | Noted: 2023-09-16

## 2023-09-16 PROBLEM — R60.9 EDEMA: Status: ACTIVE | Noted: 2023-09-16

## 2023-09-16 PROBLEM — D64.9 ANEMIA: Status: ACTIVE | Noted: 2023-09-16

## 2023-09-16 PROBLEM — E55.9 VITAMIN D DEFICIENCY: Status: ACTIVE | Noted: 2021-12-28

## 2023-09-16 PROBLEM — H16.223 KERATOCONJUNCTIVITIS SICCA OF BOTH EYES NOT DUE TO SJOGREN'S SYNDROME: Status: ACTIVE | Noted: 2023-09-16

## 2023-09-16 PROBLEM — R60.0 BILATERAL LOWER EXTREMITY EDEMA: Status: ACTIVE | Noted: 2022-01-07

## 2023-09-16 PROBLEM — M62.81 MUSCLE WEAKNESS: Status: ACTIVE | Noted: 2023-09-16

## 2023-09-16 PROBLEM — R06.02 SOB (SHORTNESS OF BREATH) ON EXERTION: Status: ACTIVE | Noted: 2023-09-16

## 2023-09-16 PROBLEM — H02.88B MEIBOMIAN GLAND DYSFUNCTION (MGD) OF UPPER AND LOWER EYELID OF LEFT EYE: Status: ACTIVE | Noted: 2023-09-16

## 2023-09-16 PROBLEM — J30.9 ALLERGIC RHINITIS: Status: ACTIVE | Noted: 2023-09-16

## 2023-09-16 PROBLEM — S22.39XA RIB FRACTURE: Status: ACTIVE | Noted: 2023-09-16

## 2023-09-16 PROBLEM — I87.2 VENOUS INSUFFICIENCY (CHRONIC) (PERIPHERAL): Status: ACTIVE | Noted: 2018-11-08

## 2023-09-16 PROBLEM — R91.1 LUNG NODULE: Status: ACTIVE | Noted: 2023-09-16

## 2023-09-16 PROBLEM — S32.592A CLOSED FRACTURE OF MULTIPLE PUBIC RAMI, LEFT, INITIAL ENCOUNTER (MULTI): Status: ACTIVE | Noted: 2021-12-27

## 2023-09-16 PROBLEM — H93.13 TINNITUS OF BOTH EARS: Status: ACTIVE | Noted: 2023-09-16

## 2023-09-16 PROBLEM — H02.88A MEIBOMIAN GLAND DYSFUNCTION (MGD) OF UPPER AND LOWER EYELID OF RIGHT EYE: Status: ACTIVE | Noted: 2023-09-16

## 2023-09-16 PROBLEM — K57.30 SIGMOID DIVERTICULOSIS: Status: ACTIVE | Noted: 2021-12-27

## 2023-09-16 PROBLEM — M67.00 ACQUIRED SHORT ACHILLES TENDON: Status: ACTIVE | Noted: 2023-09-16

## 2023-09-16 PROBLEM — S32.509A: Status: ACTIVE | Noted: 2023-09-16

## 2023-09-16 PROBLEM — T84.84XA PAINFUL ORTHOPAEDIC HARDWARE (CMS-HCC): Status: ACTIVE | Noted: 2023-09-16

## 2023-09-16 PROBLEM — I87.329 CHRONIC VENOUS HYPERTENSION WITH INFLAMMATION: Status: ACTIVE | Noted: 2023-09-16

## 2023-09-16 PROBLEM — S42.256D CLOSED NONDISPLACED FRACTURE OF GREATER TUBEROSITY OF HUMERUS WITH ROUTINE HEALING: Status: ACTIVE | Noted: 2023-09-16

## 2023-09-16 PROBLEM — M79.89 LEG SWELLING: Status: ACTIVE | Noted: 2023-09-16

## 2023-09-16 PROBLEM — S22.42XA CLOSED FRACTURE OF MULTIPLE RIBS OF LEFT SIDE: Status: ACTIVE | Noted: 2021-12-27

## 2023-09-16 PROBLEM — S42.209A PROXIMAL HUMERUS FRACTURE: Status: ACTIVE | Noted: 2023-09-16

## 2023-09-16 PROBLEM — R22.1 NECK MASS: Status: ACTIVE | Noted: 2023-09-16

## 2023-09-16 PROBLEM — H52.02 HYPERMETROPIA OF LEFT EYE: Status: ACTIVE | Noted: 2023-09-16
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
## 2023-09-16 PROBLEM — R82.90 ABNORMAL URINALYSIS: Status: ACTIVE | Noted: 2023-09-16

## 2023-09-16 PROBLEM — S22.080A CLOSED WEDGE COMPRESSION FRACTURE OF T11 VERTEBRA (MULTI): Status: ACTIVE | Noted: 2021-12-27

## 2023-09-16 PROBLEM — N95.1 MENOPAUSE SYNDROME: Status: ACTIVE | Noted: 2023-09-16

## 2023-09-16 PROBLEM — B02.29 POSTHERPETIC NEURALGIA: Status: ACTIVE | Noted: 2023-09-16

## 2023-09-16 PROBLEM — M72.2 PLANTAR FASCIITIS, LEFT: Status: ACTIVE | Noted: 2023-09-16

## 2023-09-16 PROBLEM — M41.50 DEGENERATIVE SCOLIOSIS IN ADULT PATIENT: Status: ACTIVE | Noted: 2023-09-16

## 2023-09-16 PROBLEM — M21.40 PES PLANUS: Status: ACTIVE | Noted: 2023-09-16

## 2023-09-16 RX ORDER — CEPHALEXIN 500 MG/1
500 CAPSULE ORAL EVERY 6 HOURS
COMMUNITY
Start: 2023-02-19 | End: 2023-11-01 | Stop reason: ALTCHOICE

## 2023-10-02 ENCOUNTER — OFFICE VISIT (OUTPATIENT)
Dept: PRIMARY CARE | Facility: CLINIC | Age: 83
End: 2023-10-02
Payer: MEDICARE

## 2023-10-02 VITALS
BODY MASS INDEX: 33.37 KG/M2 | TEMPERATURE: 97.8 F | HEART RATE: 68 BPM | WEIGHT: 165.2 LBS | SYSTOLIC BLOOD PRESSURE: 136 MMHG | DIASTOLIC BLOOD PRESSURE: 80 MMHG

## 2023-10-02 DIAGNOSIS — I77.810 ASCENDING AORTA DILATATION (CMS-HCC): ICD-10-CM

## 2023-10-02 DIAGNOSIS — M47.816 LUMBAR SPONDYLOSIS: ICD-10-CM

## 2023-10-02 DIAGNOSIS — I79.0 ANEURYSM OF AORTA IN DISEASES CLASSIFIED ELSEWHERE (CMS-HCC): ICD-10-CM

## 2023-10-02 DIAGNOSIS — E78.2 MIXED HYPERLIPIDEMIA: ICD-10-CM

## 2023-10-02 DIAGNOSIS — R60.0 BILATERAL LOWER EXTREMITY EDEMA: ICD-10-CM

## 2023-10-02 DIAGNOSIS — I10 BENIGN ESSENTIAL HYPERTENSION: Primary | ICD-10-CM

## 2023-10-02 PROCEDURE — 1126F AMNT PAIN NOTED NONE PRSNT: CPT | Performed by: INTERNAL MEDICINE

## 2023-10-02 PROCEDURE — 1036F TOBACCO NON-USER: CPT | Performed by: INTERNAL MEDICINE

## 2023-10-02 PROCEDURE — 1160F RVW MEDS BY RX/DR IN RCRD: CPT | Performed by: INTERNAL MEDICINE

## 2023-10-02 PROCEDURE — 3075F SYST BP GE 130 - 139MM HG: CPT | Performed by: INTERNAL MEDICINE

## 2023-10-02 PROCEDURE — 1159F MED LIST DOCD IN RCRD: CPT | Performed by: INTERNAL MEDICINE

## 2023-10-02 PROCEDURE — 3079F DIAST BP 80-89 MM HG: CPT | Performed by: INTERNAL MEDICINE

## 2023-10-02 PROCEDURE — 99213 OFFICE O/P EST LOW 20 MIN: CPT | Performed by: INTERNAL MEDICINE

## 2023-10-02 ASSESSMENT — ENCOUNTER SYMPTOMS
LIGHT-HEADEDNESS: 0
SPEECH DIFFICULTY: 0
CONSTIPATION: 0
WOUND: 0
CHEST TIGHTNESS: 0
MYALGIAS: 0
EYE DISCHARGE: 0
NECK PAIN: 0
PALPITATIONS: 0
BRUISES/BLEEDS EASILY: 0
DYSURIA: 0
ABDOMINAL DISTENTION: 0
COUGH: 0
CHILLS: 0
POLYPHAGIA: 0
BACK PAIN: 1
HEMATURIA: 0
NECK STIFFNESS: 0
TROUBLE SWALLOWING: 0
BLOOD IN STOOL: 0
DIFFICULTY URINATING: 0
RHINORRHEA: 0
CHOKING: 0
PHOTOPHOBIA: 0
FACIAL SWELLING: 0
JOINT SWELLING: 0
SLEEP DISTURBANCE: 0
NAUSEA: 0
FLANK PAIN: 0
SEIZURES: 0
ABDOMINAL PAIN: 0
SINUS PRESSURE: 0
HEADACHES: 0
WHEEZING: 0
DIAPHORESIS: 0
SHORTNESS OF BREATH: 0
EYE ITCHING: 0
VOMITING: 0
SORE THROAT: 0
ANAL BLEEDING: 0
VOICE CHANGE: 0
FREQUENCY: 0
ACTIVITY CHANGE: 0
FATIGUE: 0
APPETITE CHANGE: 0
FACIAL ASYMMETRY: 0
TREMORS: 0
RECTAL PAIN: 0
SINUS PAIN: 0
NUMBNESS: 0
ARTHRALGIAS: 1
DIARRHEA: 0
WEAKNESS: 0
EYE REDNESS: 0
STRIDOR: 0
COLOR CHANGE: 0
DIZZINESS: 0
EYE PAIN: 0
ADENOPATHY: 0
POLYDIPSIA: 0

## 2023-10-02 ASSESSMENT — PAIN SCALES - GENERAL: PAINLEVEL: 0-NO PAIN

## 2023-10-02 NOTE — PROGRESS NOTES
Subjective   Patient ID: Shila Venegas is a 83 y.o. female who presents for Follow-up (3 MONTH).    Patient presents for follow-up.  She has been compliant with her medications, and exercise.  She is currently without new complaints.  She reports baseline back and knee pain.  She is scheduled for Mohs surgery on her face.  She denies any headaches, no dizziness, no chest pain or shortness of breath.  She denies abdominal pain no nausea vomiting or diarrhea.  She reports no new musculoskeletal complaints.         Review of Systems   Constitutional:  Negative for activity change, appetite change, chills, diaphoresis and fatigue.   HENT:  Negative for congestion, dental problem, drooling, ear discharge, ear pain, facial swelling, hearing loss, mouth sores, nosebleeds, postnasal drip, rhinorrhea, sinus pressure, sinus pain, sneezing, sore throat, tinnitus, trouble swallowing and voice change.    Eyes:  Negative for photophobia, pain, discharge, redness, itching and visual disturbance.   Respiratory:  Negative for cough, choking, chest tightness, shortness of breath, wheezing and stridor.    Cardiovascular:  Negative for chest pain, palpitations and leg swelling.   Gastrointestinal:  Negative for abdominal distention, abdominal pain, anal bleeding, blood in stool, constipation, diarrhea, nausea, rectal pain and vomiting.   Endocrine: Negative for cold intolerance, heat intolerance, polydipsia, polyphagia and polyuria.   Genitourinary:  Negative for decreased urine volume, difficulty urinating, dysuria, enuresis, flank pain, frequency, genital sores, hematuria and urgency.   Musculoskeletal:  Positive for arthralgias and back pain. Negative for gait problem, joint swelling, myalgias, neck pain and neck stiffness.   Skin:  Positive for rash. Negative for color change, pallor and wound.   Neurological:  Negative for dizziness, tremors, seizures, syncope, facial asymmetry, speech difficulty, weakness, light-headedness,  numbness and headaches.   Hematological:  Negative for adenopathy. Does not bruise/bleed easily.   Psychiatric/Behavioral:  Negative for sleep disturbance.        Objective   /80   Pulse 68   Temp 36.6 °C (97.8 °F)   Wt 74.9 kg (165 lb 3.2 oz)   BMI 33.37 kg/m²     Physical Exam  Constitutional:       Appearance: Normal appearance.   Cardiovascular:      Rate and Rhythm: Normal rate and regular rhythm.      Heart sounds: No murmur (Systolic murmur present) heard.     No gallop.   Pulmonary:      Effort: No respiratory distress.      Breath sounds: No wheezing or rales.   Abdominal:      General: There is no distension.      Palpations: There is no mass.      Tenderness: There is no abdominal tenderness. There is no guarding.   Musculoskeletal:      Right lower leg: Edema (baseline edema) present.      Left lower leg: Edema (baseline edema) present.   Neurological:      Mental Status: She is alert.         Assessment/Plan   Diagnoses and all orders for this visit:  Benign essential hypertension-low-salt diet and exercise  Mixed hyperlipidemia-we will continue with present management  Lumbar spondylosis-stable symptoms  Bilateral lower extremity edema-symptoms have been stable  Ascending aorta dilatation (CMS/HCC)-we will schedule an echo  -     Transthoracic Echo (TTE) Complete; Future  Aneurysm of aorta in diseases classified elsewhere (CMS/HCC)  -     Transthoracic Echo (TTE) Complete; Future  DJD-stable symptoms.  Health maintenance-she will get her flu and COVID-vaccine at the pharmacy.

## 2023-10-02 NOTE — PROGRESS NOTES
Subjective   Patient ID: Shila Venegas is a 83 y.o. female who presents for Follow-up (3 MONTH).    HPI     Review of Systems    Objective   Wt 74.9 kg (165 lb 3.2 oz)   BMI 33.37 kg/m²     Physical Exam    Assessment/Plan

## 2023-10-17 ENCOUNTER — OFFICE VISIT (OUTPATIENT)
Dept: OPHTHALMOLOGY | Facility: CLINIC | Age: 83
End: 2023-10-17
Payer: MEDICARE

## 2023-10-17 DIAGNOSIS — H25.813 COMBINED FORM OF SENILE CATARACT OF BOTH EYES: Primary | ICD-10-CM

## 2023-10-17 DIAGNOSIS — H52.02 HYPERMETROPIA OF LEFT EYE: ICD-10-CM

## 2023-10-17 DIAGNOSIS — H52.11 MYOPIA OF RIGHT EYE: ICD-10-CM

## 2023-10-17 DIAGNOSIS — H16.223 KERATOCONJUNCTIVITIS SICCA OF BOTH EYES NOT DUE TO SJOGREN'S SYNDROME: ICD-10-CM

## 2023-10-17 DIAGNOSIS — H52.223 REGULAR ASTIGMATISM OF BOTH EYES: ICD-10-CM

## 2023-10-17 DIAGNOSIS — H52.4 PRESBYOPIA: ICD-10-CM

## 2023-10-17 DIAGNOSIS — H02.88B MEIBOMIAN GLAND DYSFUNCTION (MGD) OF UPPER AND LOWER EYELID OF LEFT EYE: ICD-10-CM

## 2023-10-17 DIAGNOSIS — H02.88A MEIBOMIAN GLAND DYSFUNCTION (MGD) OF UPPER AND LOWER EYELID OF RIGHT EYE: ICD-10-CM

## 2023-10-17 PROCEDURE — 99214 OFFICE O/P EST MOD 30 MIN: CPT | Performed by: OPTOMETRIST

## 2023-10-17 PROCEDURE — 92015 DETERMINE REFRACTIVE STATE: CPT | Performed by: OPTOMETRIST

## 2023-10-17 ASSESSMENT — CONF VISUAL FIELD
OD_SUPERIOR_TEMPORAL_RESTRICTION: 0
OS_INFERIOR_NASAL_RESTRICTION: 0
OS_NORMAL: 1
OD_SUPERIOR_NASAL_RESTRICTION: 0
OD_INFERIOR_TEMPORAL_RESTRICTION: 0
OS_SUPERIOR_TEMPORAL_RESTRICTION: 0
OS_SUPERIOR_NASAL_RESTRICTION: 0
OS_INFERIOR_TEMPORAL_RESTRICTION: 0
METHOD: COUNTING FINGERS
OD_INFERIOR_NASAL_RESTRICTION: 0
OD_NORMAL: 1

## 2023-10-17 ASSESSMENT — REFRACTION_MANIFEST
OD_SPHERE: -1.50
OD_AXIS: 128
OS_AXIS: 109
OD_ADD: +3.00
OD_CYLINDER: -0.50
OD_CYLINDER: +0.25
OS_CYLINDER: +1.00
OD_SPHERE: -1.75
OS_SPHERE: -0.50
OD_AXIS: 038
OS_ADD: +3.00
OS_AXIS: 019
OS_SPHERE: +0.50
OS_CYLINDER: -1.25

## 2023-10-17 ASSESSMENT — EXTERNAL EXAM - RIGHT EYE: OD_EXAM: NORMAL

## 2023-10-17 ASSESSMENT — ENCOUNTER SYMPTOMS
ENDOCRINE NEGATIVE: 0
HEMATOLOGIC/LYMPHATIC NEGATIVE: 0
CONSTITUTIONAL NEGATIVE: 0
PSYCHIATRIC NEGATIVE: 0
CARDIOVASCULAR NEGATIVE: 0
MUSCULOSKELETAL NEGATIVE: 0
ALLERGIC/IMMUNOLOGIC NEGATIVE: 0
NEUROLOGICAL NEGATIVE: 0
GASTROINTESTINAL NEGATIVE: 0
EYES NEGATIVE: 0
RESPIRATORY NEGATIVE: 0

## 2023-10-17 ASSESSMENT — VISUAL ACUITY
METHOD: SNELLEN - LINEAR
OD_SC: 20/70
OD_PH_SC: 20/40
OS_SC: 20/50
OS_SC+: -3
OS_PH_SC: 20/40

## 2023-10-17 ASSESSMENT — CUP TO DISC RATIO
OS_RATIO: 0.35
OD_RATIO: 0.35

## 2023-10-17 ASSESSMENT — SLIT LAMP EXAM - LIDS
COMMENTS: NORMAL
COMMENTS: NORMAL

## 2023-10-17 ASSESSMENT — EXTERNAL EXAM - LEFT EYE: OS_EXAM: NORMAL

## 2023-10-17 NOTE — PROGRESS NOTES
Assessment/Plan   Diagnoses and all orders for this visit:  Combined form of senile cataract of both eyes  Patient's cataracts are not visually significant for patient at this time. Will monitor for changes. No indication for surgery at this time.    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 Monitor 1 year or sooner prn.  Hypermetropia of left eye  Myopia of right eye  Regular astigmatism of both eyes  Presbyopia  New spec rx released today per patient request. Ocular health wnl for age OU. Monitor 1 year or sooner prn. Refraction billed today. Discussed BCVA 20/30-. Consider cataract eval when pt is ready.

## 2023-10-19 ENCOUNTER — HOSPITAL ENCOUNTER (OUTPATIENT)
Dept: CARDIOLOGY | Facility: CLINIC | Age: 83
Discharge: HOME | End: 2023-10-19
Payer: MEDICARE

## 2023-10-19 DIAGNOSIS — I79.0 ANEURYSM OF AORTA IN DISEASES CLASSIFIED ELSEWHERE (CMS-HCC): ICD-10-CM

## 2023-10-19 DIAGNOSIS — I77.810 ASCENDING AORTA DILATATION (CMS-HCC): ICD-10-CM

## 2023-10-19 PROCEDURE — 93306 TTE W/DOPPLER COMPLETE: CPT | Performed by: SPECIALIST

## 2023-10-19 PROCEDURE — 93306 TTE W/DOPPLER COMPLETE: CPT

## 2023-10-20 DIAGNOSIS — I35.0 AORTIC VALVE STENOSIS, ETIOLOGY OF CARDIAC VALVE DISEASE UNSPECIFIED: Primary | ICD-10-CM

## 2023-10-20 LAB — EJECTION FRACTION APICAL 4 CHAMBER: 57.8

## 2023-10-30 DIAGNOSIS — I10 BENIGN ESSENTIAL HYPERTENSION: Primary | ICD-10-CM

## 2023-10-30 RX ORDER — TRIAMTERENE AND HYDROCHLOROTHIAZIDE 75; 50 MG/1; MG/1
1 TABLET ORAL DAILY
Qty: 90 TABLET | Refills: 10 | Status: SHIPPED | OUTPATIENT
Start: 2023-10-30 | End: 2024-04-01 | Stop reason: SDUPTHER

## 2023-11-01 ENCOUNTER — OFFICE VISIT (OUTPATIENT)
Dept: CARDIOLOGY | Facility: CLINIC | Age: 83
End: 2023-11-01
Payer: MEDICARE

## 2023-11-01 VITALS
BODY MASS INDEX: 32 KG/M2 | SYSTOLIC BLOOD PRESSURE: 118 MMHG | HEIGHT: 60 IN | DIASTOLIC BLOOD PRESSURE: 75 MMHG | RESPIRATION RATE: 16 BRPM | HEART RATE: 100 BPM | WEIGHT: 163 LBS

## 2023-11-01 DIAGNOSIS — I87.2 VENOUS INSUFFICIENCY (CHRONIC) (PERIPHERAL): Primary | ICD-10-CM

## 2023-11-01 PROCEDURE — 1160F RVW MEDS BY RX/DR IN RCRD: CPT | Performed by: INTERNAL MEDICINE

## 2023-11-01 PROCEDURE — 99213 OFFICE O/P EST LOW 20 MIN: CPT | Performed by: INTERNAL MEDICINE

## 2023-11-01 PROCEDURE — 1036F TOBACCO NON-USER: CPT | Performed by: INTERNAL MEDICINE

## 2023-11-01 PROCEDURE — 3078F DIAST BP <80 MM HG: CPT | Performed by: INTERNAL MEDICINE

## 2023-11-01 PROCEDURE — 99213 OFFICE O/P EST LOW 20 MIN: CPT | Mod: PO | Performed by: INTERNAL MEDICINE

## 2023-11-01 PROCEDURE — 1126F AMNT PAIN NOTED NONE PRSNT: CPT | Performed by: INTERNAL MEDICINE

## 2023-11-01 PROCEDURE — 3074F SYST BP LT 130 MM HG: CPT | Performed by: INTERNAL MEDICINE

## 2023-11-01 PROCEDURE — 1159F MED LIST DOCD IN RCRD: CPT | Performed by: INTERNAL MEDICINE

## 2023-11-01 ASSESSMENT — PATIENT HEALTH QUESTIONNAIRE - PHQ9
1. LITTLE INTEREST OR PLEASURE IN DOING THINGS: NOT AT ALL
SUM OF ALL RESPONSES TO PHQ9 QUESTIONS 1 AND 2: 0
2. FEELING DOWN, DEPRESSED OR HOPELESS: NOT AT ALL

## 2023-11-01 ASSESSMENT — PAIN SCALES - GENERAL: PAINLEVEL: 0-NO PAIN

## 2023-11-01 ASSESSMENT — COLUMBIA-SUICIDE SEVERITY RATING SCALE - C-SSRS
1. IN THE PAST MONTH, HAVE YOU WISHED YOU WERE DEAD OR WISHED YOU COULD GO TO SLEEP AND NOT WAKE UP?: NO
2. HAVE YOU ACTUALLY HAD ANY THOUGHTS OF KILLING YOURSELF?: NO
6. HAVE YOU EVER DONE ANYTHING, STARTED TO DO ANYTHING, OR PREPARED TO DO ANYTHING TO END YOUR LIFE?: NO

## 2023-11-01 NOTE — PROGRESS NOTES
OUTPATIENT FOLLOW-UP -  VASCULAR MEDICINE    DOS: 11/1/23  Last seen:    6/20/23    REQUESTING PHYSICIAN:   Dr. Ariel Martínez, PCP     REASON FOR FOLLOW-UP:  here for follow up CVI and h/o blister    HISTORY OF PRESENT ILLNESS:     82 yo lady here for follow up CVI and h/o blister. Reports doing well. Using the 20-30 mmHg stockings. Dons with gloves and this works well for her. Has mohs on her face and this is taking a while to heal. +chronic skin changes on the legs but not intervening sores or ulcers.    REVIEW OF SYSTEMS:     weight is stable  No sores, ulcers, rashes, +skin lesions  +edema, no calf pain    PHYSICAL EXAMINATION:   Gen: Appears well, NAD  Ext: no sig edema right; left 1+, nontender  Skin: chronic derm lesions erica  LLE with LDS and hemosiderin staining over the gaiter area  Pulses: DP 2+;   Mood and affect appropriate    ADDITIONAL DATA:   20-30mmHg compression worn  right calf:  39.0cm left:  40.0cm    ASSESSMENT/PLAN:  here for follow up CVI and h/o blister - continue the skin care. Continue the compression stocking - 20-30 mmHg daily. Continue to elevate at night. Discussed the skin changes with your dermatologist. Follow up 6 months - sooner for new lesions, blisters or other concerns.

## 2023-11-01 NOTE — PATIENT INSTRUCTIONS
here for follow up CVI and h/o blister - continue the skin care. Continue the compression stocking - 20-30 mmHg daily. Continue to elevate at night. Discussed the skin changes with your dermatologist. Follow up 6 months - sooner for new lesions, blisters or other concerns.

## 2023-11-07 ENCOUNTER — APPOINTMENT (OUTPATIENT)
Dept: CARDIOLOGY | Facility: CLINIC | Age: 83
End: 2023-11-07
Payer: MEDICARE

## 2023-11-20 ENCOUNTER — OFFICE VISIT (OUTPATIENT)
Dept: CARDIOLOGY | Facility: CLINIC | Age: 83
End: 2023-11-20
Payer: MEDICARE

## 2023-11-20 VITALS
SYSTOLIC BLOOD PRESSURE: 134 MMHG | DIASTOLIC BLOOD PRESSURE: 77 MMHG | WEIGHT: 162 LBS | BODY MASS INDEX: 31.8 KG/M2 | HEART RATE: 83 BPM | HEIGHT: 60 IN | OXYGEN SATURATION: 93 %

## 2023-11-20 DIAGNOSIS — I70.0 ATHEROSCLEROSIS OF AORTA (CMS-HCC): ICD-10-CM

## 2023-11-20 DIAGNOSIS — I10 BENIGN ESSENTIAL HYPERTENSION: Primary | ICD-10-CM

## 2023-11-20 DIAGNOSIS — I25.84 CORONARY ARTERY CALCIFICATION: ICD-10-CM

## 2023-11-20 DIAGNOSIS — I25.10 CORONARY ARTERY CALCIFICATION: ICD-10-CM

## 2023-11-20 DIAGNOSIS — R60.9 EDEMA, UNSPECIFIED TYPE: ICD-10-CM

## 2023-11-20 DIAGNOSIS — R06.09 DOE (DYSPNEA ON EXERTION): ICD-10-CM

## 2023-11-20 DIAGNOSIS — I35.0 AORTIC VALVE STENOSIS, ETIOLOGY OF CARDIAC VALVE DISEASE UNSPECIFIED: ICD-10-CM

## 2023-11-20 DIAGNOSIS — E78.5 HYPERLIPIDEMIA, UNSPECIFIED HYPERLIPIDEMIA TYPE: ICD-10-CM

## 2023-11-20 PROCEDURE — 99214 OFFICE O/P EST MOD 30 MIN: CPT | Performed by: INTERNAL MEDICINE

## 2023-11-20 PROCEDURE — 1126F AMNT PAIN NOTED NONE PRSNT: CPT | Performed by: INTERNAL MEDICINE

## 2023-11-20 PROCEDURE — 1160F RVW MEDS BY RX/DR IN RCRD: CPT | Performed by: INTERNAL MEDICINE

## 2023-11-20 PROCEDURE — 1159F MED LIST DOCD IN RCRD: CPT | Performed by: INTERNAL MEDICINE

## 2023-11-20 PROCEDURE — 1036F TOBACCO NON-USER: CPT | Performed by: INTERNAL MEDICINE

## 2023-11-20 PROCEDURE — 99214 OFFICE O/P EST MOD 30 MIN: CPT | Mod: PO,25 | Performed by: INTERNAL MEDICINE

## 2023-11-20 PROCEDURE — 93010 ELECTROCARDIOGRAM REPORT: CPT | Performed by: INTERNAL MEDICINE

## 2023-11-20 PROCEDURE — 93005 ELECTROCARDIOGRAM TRACING: CPT | Mod: PO | Performed by: INTERNAL MEDICINE

## 2023-11-20 PROCEDURE — 3074F SYST BP LT 130 MM HG: CPT | Performed by: INTERNAL MEDICINE

## 2023-11-20 PROCEDURE — 3078F DIAST BP <80 MM HG: CPT | Performed by: INTERNAL MEDICINE

## 2023-11-20 ASSESSMENT — ENCOUNTER SYMPTOMS
MYALGIAS: 0
LOSS OF SENSATION IN FEET: 0
FEVER: 0
COUGH: 0
DIARRHEA: 0
WHEEZING: 0
DYSURIA: 0
FALLS: 0
OCCASIONAL FEELINGS OF UNSTEADINESS: 1
HEADACHES: 1
HEMATURIA: 0
ABDOMINAL PAIN: 0
VOMITING: 0
MEMORY LOSS: 0
BLOATING: 0
HEMOPTYSIS: 0
NAUSEA: 0
CHILLS: 0
ALTERED MENTAL STATUS: 0
DEPRESSION: 0
CONSTIPATION: 0

## 2023-11-20 ASSESSMENT — PAIN SCALES - GENERAL: PAINLEVEL: 0-NO PAIN

## 2023-11-20 ASSESSMENT — PATIENT HEALTH QUESTIONNAIRE - PHQ9
SUM OF ALL RESPONSES TO PHQ9 QUESTIONS 1 AND 2: 0
2. FEELING DOWN, DEPRESSED OR HOPELESS: NOT AT ALL
1. LITTLE INTEREST OR PLEASURE IN DOING THINGS: NOT AT ALL

## 2023-11-20 ASSESSMENT — COLUMBIA-SUICIDE SEVERITY RATING SCALE - C-SSRS
2. HAVE YOU ACTUALLY HAD ANY THOUGHTS OF KILLING YOURSELF?: NO
6. HAVE YOU EVER DONE ANYTHING, STARTED TO DO ANYTHING, OR PREPARED TO DO ANYTHING TO END YOUR LIFE?: NO
1. IN THE PAST MONTH, HAVE YOU WISHED YOU WERE DEAD OR WISHED YOU COULD GO TO SLEEP AND NOT WAKE UP?: NO

## 2023-11-20 NOTE — PROGRESS NOTES
Chief complaint:  AS     HPI  82 yo WF w/ h/o CAC, AA, AVS, TR now here for cardiology consult. No chest pain. No dyspnea at rest. +occ WU (mod exertion). No orthopnea/PND. No palps. +occ LH on getting up quick. No syncope. +ch LE edema. No claudication. +ch cough.   ECG 6/18: SR (88), PVCs  ECG 11/23: SR (83), normal  Echo 10/23: EF 60-65%, mod LAE, mod AVS (25/17/1.0), mod TR, PASP 40  CT chest 7/18: CAC, nl heart size, no peric eff, MAC, AA, AsAo 3.8cm  CT ab 12/21: AA  LLE US 2/23: no DVT    Review of Systems   Constitutional: Negative for chills, fever and malaise/fatigue.   HENT:  Negative for hearing loss.    Eyes:  Negative for visual disturbance.   Respiratory:  Negative for cough, hemoptysis and wheezing.    Skin:  Negative for rash.   Musculoskeletal:  Negative for falls and myalgias.   Gastrointestinal:  Negative for bloating, abdominal pain, constipation, diarrhea, dysphagia, nausea and vomiting.   Genitourinary:  Negative for dysuria and hematuria.   Neurological:  Positive for headaches.   Psychiatric/Behavioral:  Negative for altered mental status, depression and memory loss.         Social History     Tobacco Use    Smoking status: Never    Smokeless tobacco: Never   Substance Use Topics    Alcohol use: Yes     Alcohol/week: 1.0 standard drink of alcohol     Types: 1 Glasses of wine per week        Family History   Problem Relation Name Age of Onset    Rheumatic fever Father      Heart failure Father          Allergies   Allergen Reactions    Ace Inhibitors Unknown     cough        Current Outpatient Medications   Medication Instructions    acetaminophen (Acetaminophen Extra Strength) 500 mg tablet 2 tablet 2 TIMES DAILY (route: oral)    alendronate (Fosamax) 70 mg tablet TAKE 1 TABLET EVERY WEEK    alendronate (FOSAMAX) 70 mg, oral, Every 7 days    alendronate (FOSAMAX) 70 mg, oral, Weekly    aspirin (Adult Low Dose Aspirin) 81 mg EC tablet 1 tablet DAILY (route: oral)    atenolol (Tenormin) 25 mg  tablet oral    atorvastatin (LIPITOR) 10 mg, oral, Daily    calcium carbonate-vitamin D3 (Calcium 500 + D) 500 mg-10 mcg (400 unit) chewable tablet oral    cholecalciferol (VITAMIN D-3) 50 mcg, oral, Daily    losartan (Cozaar) 50 mg tablet 1 tablet, oral, Daily    losartan (COZAAR) 25 mg, oral, Daily    meloxicam (MOBIC) 7.5 mg, oral, Daily, with food    metoprolol succinate XL (Toprol-XL) 25 mg 24 hr tablet .5 tablet DAILY (route: oral)    metoprolol succinate XL (TOPROL-XL) 25 mg, oral, Daily    omeprazole (PriLOSEC) 40 mg DR capsule 1 capsule, oral, Daily    risedronate (Actonel) 35 mg tablet oral    TRIAMTERENE ORAL 37.5 mg, oral, Daily RT    triamterene-hydrochlorothiazid (Maxzide) 75-50 mg tablet 1 tablet, oral, Daily        There were no vitals filed for this visit.     Physical Exam  Constitutional:       Appearance: Normal appearance.   HENT:      Head: Normocephalic and atraumatic.      Nose: Nose normal.   Neck:      Vascular: No carotid bruit.   Cardiovascular:      Rate and Rhythm: Normal rate and regular rhythm.      Heart sounds: Murmur heard.      Systolic murmur is present with a grade of 2/6.   Pulmonary:      Effort: Pulmonary effort is normal.      Breath sounds: Normal breath sounds.   Abdominal:      Palpations: Abdomen is soft.      Tenderness: There is no abdominal tenderness.   Musculoskeletal:      Right lower le+ Pitting Edema present.      Left lower le+ Pitting Edema present.   Skin:     General: Skin is warm and dry.   Neurological:      General: No focal deficit present.      Mental Status: She is alert.   Psychiatric:         Mood and Affect: Mood normal.         Judgment: Judgment normal.          Lab Results   Component Value Date    CR 1.08      HGB 12.3      K 4.1      MG      BNP            LDL 75     TSH 0.98      Assessment/Plan   82 yo WF w/ h/o CAC, AA, AVS, TR. Doing fair. Echo 10/23 with mod AVS and mod TR. Can consider echo every 2-3 years (earlier for new  or worsened symptoms).   -continue ASA 81 every day  -continue Metoprolol Succinate 25 every day  -continue Losartan 25 every day  -continue Triam-hydrochlorothiazide 75-50 qd  -continue Atorva 10 every day   -FU ANNABELLE Jimenez MD

## 2023-11-27 LAB
ATRIAL RATE: 83 BPM
P AXIS: 7 DEGREES
P OFFSET: 192 MS
P ONSET: 143 MS
PR INTERVAL: 168 MS
Q ONSET: 227 MS
QRS COUNT: 13 BEATS
QRS DURATION: 76 MS
QT INTERVAL: 394 MS
QTC CALCULATION(BAZETT): 462 MS
QTC FREDERICIA: 439 MS
R AXIS: -8 DEGREES
T AXIS: 19 DEGREES
T OFFSET: 424 MS
VENTRICULAR RATE: 83 BPM

## 2023-12-29 DIAGNOSIS — E78.5 HYPERLIPIDEMIA, UNSPECIFIED HYPERLIPIDEMIA TYPE: ICD-10-CM

## 2023-12-29 DIAGNOSIS — I10 BENIGN ESSENTIAL HYPERTENSION: Primary | ICD-10-CM

## 2023-12-29 RX ORDER — LOSARTAN POTASSIUM 25 MG/1
25 TABLET ORAL DAILY
Qty: 90 TABLET | Refills: 3 | Status: SHIPPED | OUTPATIENT
Start: 2023-12-29

## 2023-12-29 RX ORDER — METOPROLOL SUCCINATE 25 MG/1
12.5 TABLET, EXTENDED RELEASE ORAL DAILY
Qty: 45 TABLET | Refills: 3 | Status: SHIPPED | OUTPATIENT
Start: 2023-12-29

## 2023-12-29 RX ORDER — ATORVASTATIN CALCIUM 10 MG/1
10 TABLET, FILM COATED ORAL DAILY
Qty: 90 TABLET | Refills: 3 | Status: SHIPPED | OUTPATIENT
Start: 2023-12-29

## 2024-02-02 ENCOUNTER — OFFICE VISIT (OUTPATIENT)
Dept: PRIMARY CARE | Facility: CLINIC | Age: 84
End: 2024-02-02
Payer: MEDICARE

## 2024-02-02 VITALS
DIASTOLIC BLOOD PRESSURE: 76 MMHG | HEIGHT: 60 IN | TEMPERATURE: 96.5 F | SYSTOLIC BLOOD PRESSURE: 130 MMHG | BODY MASS INDEX: 32.2 KG/M2 | HEART RATE: 72 BPM | WEIGHT: 164 LBS

## 2024-02-02 DIAGNOSIS — E78.2 MIXED HYPERLIPIDEMIA: ICD-10-CM

## 2024-02-02 DIAGNOSIS — I70.0 ATHEROSCLEROSIS OF AORTA (CMS-HCC): ICD-10-CM

## 2024-02-02 DIAGNOSIS — M48.061 SPINAL STENOSIS OF LUMBAR REGION WITHOUT NEUROGENIC CLAUDICATION: Primary | ICD-10-CM

## 2024-02-02 DIAGNOSIS — I10 BENIGN ESSENTIAL HYPERTENSION: ICD-10-CM

## 2024-02-02 DIAGNOSIS — I27.20 PULMONARY HYPERTENSION (MULTI): ICD-10-CM

## 2024-02-02 PROCEDURE — 1124F ACP DISCUSS-NO DSCNMKR DOCD: CPT | Performed by: INTERNAL MEDICINE

## 2024-02-02 PROCEDURE — 3075F SYST BP GE 130 - 139MM HG: CPT | Performed by: INTERNAL MEDICINE

## 2024-02-02 PROCEDURE — 99213 OFFICE O/P EST LOW 20 MIN: CPT | Performed by: INTERNAL MEDICINE

## 2024-02-02 PROCEDURE — 1159F MED LIST DOCD IN RCRD: CPT | Performed by: INTERNAL MEDICINE

## 2024-02-02 PROCEDURE — 1036F TOBACCO NON-USER: CPT | Performed by: INTERNAL MEDICINE

## 2024-02-02 PROCEDURE — 1126F AMNT PAIN NOTED NONE PRSNT: CPT | Performed by: INTERNAL MEDICINE

## 2024-02-02 PROCEDURE — 3078F DIAST BP <80 MM HG: CPT | Performed by: INTERNAL MEDICINE

## 2024-02-02 PROCEDURE — 1160F RVW MEDS BY RX/DR IN RCRD: CPT | Performed by: INTERNAL MEDICINE

## 2024-02-02 RX ORDER — MELOXICAM 7.5 MG/1
7.5 TABLET ORAL DAILY
Qty: 30 TABLET | Refills: 3 | Status: SHIPPED | OUTPATIENT
Start: 2024-02-02

## 2024-02-02 RX ORDER — METHYLPREDNISOLONE 4 MG/1
TABLET ORAL
Qty: 21 TABLET | Refills: 0 | Status: SHIPPED | OUTPATIENT
Start: 2024-02-02 | End: 2024-02-09

## 2024-02-02 ASSESSMENT — ENCOUNTER SYMPTOMS
COLOR CHANGE: 0
SHORTNESS OF BREATH: 0
CONSTIPATION: 0
BLOOD IN STOOL: 0
TROUBLE SWALLOWING: 0
EYE ITCHING: 0
ABDOMINAL DISTENTION: 0
DIFFICULTY URINATING: 0
CHOKING: 0
STRIDOR: 0
HEADACHES: 0
DIARRHEA: 0
SORE THROAT: 0
ARTHRALGIAS: 1
HEMATURIA: 0
BACK PAIN: 1
ANAL BLEEDING: 0
FREQUENCY: 0
CHEST TIGHTNESS: 0
TREMORS: 0
FACIAL SWELLING: 0
WHEEZING: 0
SINUS PRESSURE: 0
SPEECH DIFFICULTY: 0
NECK PAIN: 0
EYE REDNESS: 0
LIGHT-HEADEDNESS: 0
EYE PAIN: 0
EYE DISCHARGE: 0
VOICE CHANGE: 0
FLANK PAIN: 0
WEAKNESS: 0
POLYDIPSIA: 0
FACIAL ASYMMETRY: 0
VOMITING: 0
WOUND: 0
PHOTOPHOBIA: 0
NAUSEA: 0
SEIZURES: 0
ABDOMINAL PAIN: 0
NUMBNESS: 0
DIZZINESS: 0
JOINT SWELLING: 0
RECTAL PAIN: 0
PALPITATIONS: 0
BRUISES/BLEEDS EASILY: 0
DIAPHORESIS: 0
FATIGUE: 0
SINUS PAIN: 0
POLYPHAGIA: 0
ACTIVITY CHANGE: 0
DYSURIA: 0
CHILLS: 0
RHINORRHEA: 0
SLEEP DISTURBANCE: 0
COUGH: 0
ADENOPATHY: 0
NECK STIFFNESS: 0
MYALGIAS: 0
APPETITE CHANGE: 0

## 2024-02-02 NOTE — PROGRESS NOTES
Subjective   Patient ID: Shila Venegas is a 83 y.o. female who presents for No chief complaint on file..    Patient presents for follow-up.  She has been compliant with her medications, diet and exercise.  She been complaining of worsening low back pain.  There is no radiation to her legs.  There are no bowel or urinary complaints.  She denies any headaches, no dizziness, no chest pain or shortness of breath.  She denies abdominal pain no nausea vomiting or diarrhea.         Review of Systems   Constitutional:  Negative for activity change, appetite change, chills, diaphoresis and fatigue.   HENT:  Negative for congestion, dental problem, drooling, ear discharge, ear pain, facial swelling, hearing loss, mouth sores, nosebleeds, postnasal drip, rhinorrhea, sinus pressure, sinus pain, sneezing, sore throat, tinnitus, trouble swallowing and voice change.    Eyes:  Negative for photophobia, pain, discharge, redness, itching and visual disturbance.   Respiratory:  Negative for cough, choking, chest tightness, shortness of breath, wheezing and stridor.    Cardiovascular:  Positive for leg swelling. Negative for chest pain and palpitations.   Gastrointestinal:  Negative for abdominal distention, abdominal pain, anal bleeding, blood in stool, constipation, diarrhea, nausea, rectal pain and vomiting.   Endocrine: Negative for cold intolerance, heat intolerance, polydipsia, polyphagia and polyuria.   Genitourinary:  Negative for decreased urine volume, difficulty urinating, dysuria, enuresis, flank pain, frequency, genital sores, hematuria and urgency.   Musculoskeletal:  Positive for arthralgias and back pain. Negative for gait problem, joint swelling, myalgias, neck pain and neck stiffness.   Skin:  Negative for color change, pallor, rash and wound.   Neurological:  Negative for dizziness, tremors, seizures, syncope, facial asymmetry, speech difficulty, weakness, light-headedness, numbness and headaches.   Hematological:   Negative for adenopathy. Does not bruise/bleed easily.   Psychiatric/Behavioral:  Negative for sleep disturbance.        Objective   /76   Pulse 72   Temp 35.8 °C (96.5 °F)   Ht 1.524 m (5')   Wt 74.4 kg (164 lb)   BMI 32.03 kg/m²     Physical Exam  Constitutional:       Appearance: Normal appearance.   Cardiovascular:      Rate and Rhythm: Normal rate and regular rhythm.      Heart sounds: No murmur heard.     No gallop.   Pulmonary:      Effort: No respiratory distress.      Breath sounds: No wheezing or rales.   Abdominal:      General: There is no distension.      Palpations: There is no mass.      Tenderness: There is no abdominal tenderness. There is no guarding.   Musculoskeletal:      Right lower leg: Edema (Baseline edema) present.      Left lower leg: Edema (Edema) present.   Neurological:      Mental Status: She is alert.         Assessment/Plan   Diagnoses and all orders for this visit:  Spinal stenosis of lumbar region without neurogenic claudication-we will treat with a Medrol Dosepak.  She will schedule appoint with pain management  -     methylPREDNISolone (Medrol Dospak) 4 mg tablets; Take as directed on package.  -     meloxicam (Mobic) 7.5 mg tablet; Take 1 tablet (7.5 mg) by mouth once daily. with food  Pulmonary hypertension (CMS/HCC)-echo has been done  Atherosclerosis of aorta (CMS/HCC)-we will modify risk factors.  Hypertension-stable on present medications.  Hyperlipidemia-we will continue with statin.  Health maintenance-he will get the RSV vaccine at the pharmacy.  Colonoscopy has been done.  Mammogram is up-to-date.  Bone density has been done.    Edema-she will follow-up with vascular.  Symptoms.

## 2024-02-13 DIAGNOSIS — Z12.11 COLON CANCER SCREENING: ICD-10-CM

## 2024-02-13 RX ORDER — OMEPRAZOLE 40 MG/1
40 CAPSULE, DELAYED RELEASE ORAL DAILY
Qty: 90 CAPSULE | Refills: 0 | Status: SHIPPED | OUTPATIENT
Start: 2024-02-13

## 2024-02-29 DIAGNOSIS — M81.8 IDIOPATHIC OSTEOPOROSIS: ICD-10-CM

## 2024-02-29 DIAGNOSIS — M81.8 IDIOPATHIC OSTEOPOROSIS: Primary | ICD-10-CM

## 2024-02-29 RX ORDER — ALENDRONATE SODIUM 70 MG/1
70 TABLET ORAL
Qty: 4 TABLET | Refills: 11 | Status: SHIPPED | OUTPATIENT
Start: 2024-02-29 | End: 2024-03-05 | Stop reason: SDUPTHER

## 2024-02-29 NOTE — TELEPHONE ENCOUNTER
Patient called to get a refill on her Alendronate bu  I do not see it on med list, but I do see it discontinued

## 2024-03-05 RX ORDER — ALENDRONATE SODIUM 70 MG/1
70 TABLET ORAL
Qty: 12 TABLET | Refills: 3 | Status: SHIPPED | OUTPATIENT
Start: 2024-03-05 | End: 2025-03-05

## 2024-04-01 DIAGNOSIS — I10 BENIGN ESSENTIAL HYPERTENSION: ICD-10-CM

## 2024-04-01 RX ORDER — TRIAMTERENE AND HYDROCHLOROTHIAZIDE 75; 50 MG/1; MG/1
1 TABLET ORAL DAILY
Qty: 90 TABLET | Refills: 3 | Status: SHIPPED | OUTPATIENT
Start: 2024-04-01

## 2024-04-10 ENCOUNTER — TELEPHONE (OUTPATIENT)
Dept: PRIMARY CARE | Facility: CLINIC | Age: 84
End: 2024-04-10

## 2024-04-10 NOTE — TELEPHONE ENCOUNTER
Called in a 7 day emergency supply for patient at [facility], has not received the order yet

## 2024-04-17 ENCOUNTER — OFFICE VISIT (OUTPATIENT)
Dept: CARDIOLOGY | Facility: CLINIC | Age: 84
End: 2024-04-17
Payer: MEDICARE

## 2024-04-17 VITALS
DIASTOLIC BLOOD PRESSURE: 90 MMHG | BODY MASS INDEX: 31.8 KG/M2 | HEART RATE: 102 BPM | WEIGHT: 162 LBS | HEIGHT: 60 IN | RESPIRATION RATE: 16 BRPM | SYSTOLIC BLOOD PRESSURE: 163 MMHG

## 2024-04-17 DIAGNOSIS — S22.080S COMPRESSION FRACTURE OF T11 VERTEBRA, SEQUELA: ICD-10-CM

## 2024-04-17 DIAGNOSIS — I87.2 VENOUS INSUFFICIENCY (CHRONIC) (PERIPHERAL): Primary | ICD-10-CM

## 2024-04-17 DIAGNOSIS — S90.426A BLISTER OF SECOND TOE: ICD-10-CM

## 2024-04-17 PROCEDURE — 99214 OFFICE O/P EST MOD 30 MIN: CPT | Performed by: INTERNAL MEDICINE

## 2024-04-17 PROCEDURE — 1036F TOBACCO NON-USER: CPT | Performed by: INTERNAL MEDICINE

## 2024-04-17 PROCEDURE — 1125F AMNT PAIN NOTED PAIN PRSNT: CPT | Performed by: INTERNAL MEDICINE

## 2024-04-17 PROCEDURE — 1160F RVW MEDS BY RX/DR IN RCRD: CPT | Performed by: INTERNAL MEDICINE

## 2024-04-17 PROCEDURE — 1159F MED LIST DOCD IN RCRD: CPT | Performed by: INTERNAL MEDICINE

## 2024-04-17 PROCEDURE — 3080F DIAST BP >= 90 MM HG: CPT | Performed by: INTERNAL MEDICINE

## 2024-04-17 PROCEDURE — 3077F SYST BP >= 140 MM HG: CPT | Performed by: INTERNAL MEDICINE

## 2024-04-17 RX ORDER — IBUPROFEN 200 MG
400 TABLET ORAL DAILY
COMMUNITY

## 2024-04-17 ASSESSMENT — PAIN SCALES - GENERAL: PAINLEVEL: 4

## 2024-04-17 ASSESSMENT — COLUMBIA-SUICIDE SEVERITY RATING SCALE - C-SSRS
2. HAVE YOU ACTUALLY HAD ANY THOUGHTS OF KILLING YOURSELF?: NO
1. IN THE PAST MONTH, HAVE YOU WISHED YOU WERE DEAD OR WISHED YOU COULD GO TO SLEEP AND NOT WAKE UP?: NO
6. HAVE YOU EVER DONE ANYTHING, STARTED TO DO ANYTHING, OR PREPARED TO DO ANYTHING TO END YOUR LIFE?: NO

## 2024-04-17 NOTE — PATIENT INSTRUCTIONS
ASSESSMENT/PLAN:    here for follow up CVI and h/o blister - new blistering - unclear etiology. Not diabetic. ?friction - given the worn shoe gear. Discussed: right - moisturize and compression daily. Left - protect from trauma - if the blister ruptures - bacitracin. Continue the compression erica. Follow up 2 weeks.

## 2024-04-17 NOTE — PROGRESS NOTES
OUTPATIENT FOLLOW-UP -  VASCULAR MEDICINE    DOS: 4/17/24  Last seen:    11/1/23    REQUESTING PHYSICIAN:  Dr. Ariel Martínez     REASON FOR FOLLOW-UP:  here for follow up CVI and h/o blister    HISTORY OF PRESENT ILLNESS:     84 yo lady here for follow up CVI and h/o blister. Comes in for add on visit - new blisters erica. Was in [place] x 3 weeks. Got back to [place] 4/4/2024 and then reports right foot started to have pain and noticed a blister on the foot  4/5/2024 - this is currently ruptured. Reports it started on the small toe and rapidly spread to the forefoot. Has been using bacitracin. Did not rupture for about 10 days. Not currently leaking. Still using the compression daily. This is 20-30 mmHg stocking.  The left toe developed over the past weekend. This is painful. Thinks this developed related to ill fitting shoe gear and dancing. Also has pain at the ankle. Sleeping in bed and props her feet up at night.     REVIEW OF SYSTEMS:     weight is stable  No sores, ulcers, rashes, +skin lesions  + edema, no calf pain    PHYSICAL EXAMINATION:   Gen: Appears well, NAD  Ext: 1+ edema gaiter area, nontender  Skin: LDS and hemosiderin staining erica  Blister dry and unroofed right forefoot; left intact m- serosanguinous  Pulses: WWP  Mood and affect appropriate    ADDITIONAL DATA:   20-30mmHg compression worn  right calf:  42.0cm left calf:  41.0cm     ASSESSMENT/PLAN:    here for follow up CVI and h/o blister - new blistering - unclear etiology. Not diabetic. ?friction - given the worn shoe gear. Discussed: right - moisturize and compression daily. Left - protect from trauma - if the blister ruptures - bacitracin. Continue the compression erica. Follow up 2 weeks.

## 2024-05-01 ENCOUNTER — APPOINTMENT (OUTPATIENT)
Dept: CARDIOLOGY | Facility: CLINIC | Age: 84
End: 2024-05-01
Payer: MEDICARE

## 2024-05-01 ENCOUNTER — OFFICE VISIT (OUTPATIENT)
Dept: CARDIOLOGY | Facility: CLINIC | Age: 84
End: 2024-05-01
Payer: MEDICARE

## 2024-05-01 VITALS
DIASTOLIC BLOOD PRESSURE: 82 MMHG | RESPIRATION RATE: 18 BRPM | HEART RATE: 96 BPM | BODY MASS INDEX: 31.41 KG/M2 | SYSTOLIC BLOOD PRESSURE: 138 MMHG | HEIGHT: 60 IN | WEIGHT: 160 LBS

## 2024-05-01 DIAGNOSIS — I87.2 VENOUS INSUFFICIENCY (CHRONIC) (PERIPHERAL): Primary | ICD-10-CM

## 2024-05-01 PROCEDURE — 99213 OFFICE O/P EST LOW 20 MIN: CPT | Performed by: INTERNAL MEDICINE

## 2024-05-01 PROCEDURE — 1126F AMNT PAIN NOTED NONE PRSNT: CPT | Performed by: INTERNAL MEDICINE

## 2024-05-01 PROCEDURE — 1160F RVW MEDS BY RX/DR IN RCRD: CPT | Performed by: INTERNAL MEDICINE

## 2024-05-01 PROCEDURE — 3079F DIAST BP 80-89 MM HG: CPT | Performed by: INTERNAL MEDICINE

## 2024-05-01 PROCEDURE — 3075F SYST BP GE 130 - 139MM HG: CPT | Performed by: INTERNAL MEDICINE

## 2024-05-01 PROCEDURE — 1159F MED LIST DOCD IN RCRD: CPT | Performed by: INTERNAL MEDICINE

## 2024-05-01 ASSESSMENT — COLUMBIA-SUICIDE SEVERITY RATING SCALE - C-SSRS
1. IN THE PAST MONTH, HAVE YOU WISHED YOU WERE DEAD OR WISHED YOU COULD GO TO SLEEP AND NOT WAKE UP?: NO
6. HAVE YOU EVER DONE ANYTHING, STARTED TO DO ANYTHING, OR PREPARED TO DO ANYTHING TO END YOUR LIFE?: NO
2. HAVE YOU ACTUALLY HAD ANY THOUGHTS OF KILLING YOURSELF?: NO

## 2024-05-01 ASSESSMENT — PATIENT HEALTH QUESTIONNAIRE - PHQ9
1. LITTLE INTEREST OR PLEASURE IN DOING THINGS: NOT AT ALL
2. FEELING DOWN, DEPRESSED OR HOPELESS: NOT AT ALL
SUM OF ALL RESPONSES TO PHQ9 QUESTIONS 1 AND 2: 0

## 2024-05-01 ASSESSMENT — PAIN SCALES - GENERAL: PAINLEVEL: 0-NO PAIN

## 2024-05-01 NOTE — PROGRESS NOTES
"OUTPATIENT FOLLOW-UP -  VASCULAR MEDICINE    DOS: 5/1/2024  Last seen:    4/17/2023    REQUESTING PHYSICIAN:  Dr. Ariel Martínez, PCP    REASON FOR FOLLOW-UP:  here for follow up CVI and blisters    HISTORY OF PRESENT ILLNESS:     84 yo lady here for follow up CVI and blisters. Blisters are better. Swelling is better using the compression. Right foot blister has peeled. Left remains in place but dry.       REVIEW OF SYSTEMS:     weight is stable  No sores, ulcers, rashes, + skin lesions  +edema, no calf pain    PHYSICAL EXAMINATION:   Gen: Appears well, NAD  Ext: 1+ edema, nontender  Skin: LDS erica  Dried blister erica  Pulses: WWP  Mood and affect appropriate    ADDITIONAL DATA:   Wearing 20-30mmHg compression. Right calf: 39.0cm Left calf: 39.0cm    ASSESSMENT/PLAN:    here for follow up CVI and blisters - blisters resolved. Continue to moisturize daily. Continue the 20-30 mmHg stocking. Use the \"good pairs\". Bring them to follow up for eval. Follow up 4 months.   "

## 2024-05-01 NOTE — PATIENT INSTRUCTIONS
"ASSESSMENT/PLAN:    here for follow up CVI and blisters - blisters resolved. Continue to moisturize daily. Continue the 20-30 mmHg stocking. Use the \"good pairs\". Bring them to follow up for eval. Follow up 4 months.   "
none

## 2024-05-15 ENCOUNTER — OFFICE VISIT (OUTPATIENT)
Dept: ORTHOPEDIC SURGERY | Facility: HOSPITAL | Age: 84
End: 2024-05-15
Payer: MEDICARE

## 2024-05-15 VITALS — WEIGHT: 160 LBS | HEIGHT: 60 IN | BODY MASS INDEX: 31.41 KG/M2

## 2024-05-15 DIAGNOSIS — M17.11 OSTEOARTHRITIS OF RIGHT KNEE, UNSPECIFIED OSTEOARTHRITIS TYPE: Primary | ICD-10-CM

## 2024-05-15 PROCEDURE — 2500000005 HC RX 250 GENERAL PHARMACY W/O HCPCS: Performed by: PHYSICIAN ASSISTANT

## 2024-05-15 PROCEDURE — 1125F AMNT PAIN NOTED PAIN PRSNT: CPT | Performed by: PHYSICIAN ASSISTANT

## 2024-05-15 PROCEDURE — 99212 OFFICE O/P EST SF 10 MIN: CPT | Performed by: PHYSICIAN ASSISTANT

## 2024-05-15 PROCEDURE — 1160F RVW MEDS BY RX/DR IN RCRD: CPT | Performed by: PHYSICIAN ASSISTANT

## 2024-05-15 PROCEDURE — 1036F TOBACCO NON-USER: CPT | Performed by: PHYSICIAN ASSISTANT

## 2024-05-15 PROCEDURE — 2500000004 HC RX 250 GENERAL PHARMACY W/ HCPCS (ALT 636 FOR OP/ED): Performed by: PHYSICIAN ASSISTANT

## 2024-05-15 PROCEDURE — 20610 DRAIN/INJ JOINT/BURSA W/O US: CPT | Mod: RT | Performed by: PHYSICIAN ASSISTANT

## 2024-05-15 PROCEDURE — 1159F MED LIST DOCD IN RCRD: CPT | Performed by: PHYSICIAN ASSISTANT

## 2024-05-15 RX ORDER — LIDOCAINE HYDROCHLORIDE 10 MG/ML
4 INJECTION INFILTRATION; PERINEURAL
Status: COMPLETED | OUTPATIENT
Start: 2024-05-15 | End: 2024-05-15

## 2024-05-15 RX ORDER — TRIAMCINOLONE ACETONIDE 40 MG/ML
40 INJECTION, SUSPENSION INTRA-ARTICULAR; INTRAMUSCULAR
Status: COMPLETED | OUTPATIENT
Start: 2024-05-15 | End: 2024-05-15

## 2024-05-15 RX ADMIN — LIDOCAINE HYDROCHLORIDE 4 ML: 10 INJECTION, SOLUTION INFILTRATION; PERINEURAL at 09:40

## 2024-05-15 RX ADMIN — TRIAMCINOLONE ACETONIDE 40 MG: 400 INJECTION, SUSPENSION INTRA-ARTICULAR; INTRAMUSCULAR at 09:40

## 2024-05-15 ASSESSMENT — PAIN SCALES - GENERAL: PAINLEVEL_OUTOF10: 7

## 2024-05-15 ASSESSMENT — PAIN - FUNCTIONAL ASSESSMENT: PAIN_FUNCTIONAL_ASSESSMENT: 0-10

## 2024-05-15 NOTE — PROGRESS NOTES
Shila returns to clinic today for recurrent pain of her right knee. She has a history of right knee osteoarthritis. History of left knee arthroplasty. She is requesting an injection today for her right knee.      Patient's self reported past medical history, medications, allergies, surgical history, family and social history as well as a 10 point review of systems has been documented in the new patient intake form and scanned into the patient's electronic medical record. Pertinent findings are documented in the HPI.     Physical Examination Findings:  Constitutional: Appears well-developed and well-nourished.  Head: Normocephalic and atraumatic.  Eyes: Pupils are equal and round.  Cardiovascular: Intact distal pulses.   Respiratory: Effort normal. No respiratory distress.  Neurologic: Alert and oriented to person, place, and time.  Skin: Skin is warm and dry.  Hematologic / Lymphatic: No lymphedema, lymphangitis.  Psychiatric: normal mood and affect. Behavior is normal.   Musculoskeletal: Right knee with tenderness over medial and lateral joint line. Positive retropatellar crepitus. Calf is supple and nontender. Range of motion 0-100 degrees. No effusion.     Impression: Right knee osteoarthritis     Plan: Steroid injection was performed today upon patient request. Injection was given and tolerated well. She will continue to monitor symptoms and return to our office as needed.     Patient ID: Shila Venegas is a 83 y.o. female.    L Inj/Asp: R knee on 5/15/2024 9:40 AM  Indications: pain  Details: 22 G needle, lateral approach  Medications: 4 mL lidocaine 10 mg/mL (1 %); 40 mg triamcinolone acetonide 40 mg/mL  Procedure, treatment alternatives, risks and benefits explained, specific risks discussed. Immediately prior to procedure a time out was called to verify the correct patient, procedure, equipment, support staff and site/side marked as required.         Guera Ariza PA-C  Department of Orthopaedic  Surgery  [facility]     Dictation performed with the use of voice recognition software. Syntax and grammatical errors may exist.

## 2024-06-17 ENCOUNTER — APPOINTMENT (OUTPATIENT)
Dept: PRIMARY CARE | Facility: CLINIC | Age: 84
End: 2024-06-17
Payer: MEDICARE

## 2024-06-17 ENCOUNTER — LAB (OUTPATIENT)
Dept: LAB | Facility: LAB | Age: 84
End: 2024-06-17
Payer: MEDICARE

## 2024-06-17 VITALS
WEIGHT: 161 LBS | DIASTOLIC BLOOD PRESSURE: 76 MMHG | TEMPERATURE: 97.7 F | SYSTOLIC BLOOD PRESSURE: 132 MMHG | BODY MASS INDEX: 31.44 KG/M2 | HEART RATE: 72 BPM

## 2024-06-17 DIAGNOSIS — N18.31 CHRONIC RENAL IMPAIRMENT, STAGE 3A (MULTI): Primary | ICD-10-CM

## 2024-06-17 DIAGNOSIS — L03.115 CELLULITIS OF RIGHT LOWER EXTREMITY: ICD-10-CM

## 2024-06-17 DIAGNOSIS — E55.9 VITAMIN D DEFICIENCY: ICD-10-CM

## 2024-06-17 DIAGNOSIS — E78.2 MIXED HYPERLIPIDEMIA: ICD-10-CM

## 2024-06-17 DIAGNOSIS — Z12.31 ENCOUNTER FOR SCREENING MAMMOGRAM FOR MALIGNANT NEOPLASM OF BREAST: ICD-10-CM

## 2024-06-17 DIAGNOSIS — I25.10 CORONARY ARTERY CALCIFICATION: ICD-10-CM

## 2024-06-17 DIAGNOSIS — R53.81 MALAISE: ICD-10-CM

## 2024-06-17 DIAGNOSIS — D64.9 ANEMIA, UNSPECIFIED TYPE: Primary | ICD-10-CM

## 2024-06-17 DIAGNOSIS — I10 BENIGN ESSENTIAL HYPERTENSION: Primary | ICD-10-CM

## 2024-06-17 DIAGNOSIS — D64.9 ANEMIA, UNSPECIFIED TYPE: ICD-10-CM

## 2024-06-17 DIAGNOSIS — I10 BENIGN ESSENTIAL HYPERTENSION: ICD-10-CM

## 2024-06-17 DIAGNOSIS — I25.84 CORONARY ARTERY CALCIFICATION: ICD-10-CM

## 2024-06-17 LAB
25(OH)D3 SERPL-MCNC: 49 NG/ML (ref 30–100)
ALBUMIN SERPL BCP-MCNC: 3.9 G/DL (ref 3.4–5)
ALP SERPL-CCNC: 74 U/L (ref 33–136)
ALT SERPL W P-5'-P-CCNC: 7 U/L (ref 7–45)
ANION GAP SERPL CALC-SCNC: 15 MMOL/L (ref 10–20)
AST SERPL W P-5'-P-CCNC: 11 U/L (ref 9–39)
BASOPHILS # BLD AUTO: 0.05 X10*3/UL (ref 0–0.1)
BASOPHILS NFR BLD AUTO: 0.8 %
BILIRUB SERPL-MCNC: 0.5 MG/DL (ref 0–1.2)
BUN SERPL-MCNC: 40 MG/DL (ref 6–23)
CALCIUM SERPL-MCNC: 9.7 MG/DL (ref 8.6–10.6)
CHLORIDE SERPL-SCNC: 106 MMOL/L (ref 98–107)
CHOLEST SERPL-MCNC: 135 MG/DL (ref 0–199)
CHOLESTEROL/HDL RATIO: 2.8
CO2 SERPL-SCNC: 26 MMOL/L (ref 21–32)
CREAT SERPL-MCNC: 1.24 MG/DL (ref 0.5–1.05)
EGFRCR SERPLBLD CKD-EPI 2021: 43 ML/MIN/1.73M*2
EOSINOPHIL # BLD AUTO: 0.19 X10*3/UL (ref 0–0.4)
EOSINOPHIL NFR BLD AUTO: 3.1 %
ERYTHROCYTE [DISTWIDTH] IN BLOOD BY AUTOMATED COUNT: 14.6 % (ref 11.5–14.5)
FOLATE SERPL-MCNC: 16.9 NG/ML
GLUCOSE SERPL-MCNC: 93 MG/DL (ref 74–99)
HCT VFR BLD AUTO: 37.9 % (ref 36–46)
HDLC SERPL-MCNC: 47.7 MG/DL
HGB BLD-MCNC: 11.8 G/DL (ref 12–16)
IMM GRANULOCYTES # BLD AUTO: 0.03 X10*3/UL (ref 0–0.5)
IMM GRANULOCYTES NFR BLD AUTO: 0.5 % (ref 0–0.9)
LDLC SERPL CALC-MCNC: 63 MG/DL
LYMPHOCYTES # BLD AUTO: 0.76 X10*3/UL (ref 0.8–3)
LYMPHOCYTES NFR BLD AUTO: 12.4 %
MCH RBC QN AUTO: 28.9 PG (ref 26–34)
MCHC RBC AUTO-ENTMCNC: 31.1 G/DL (ref 32–36)
MCV RBC AUTO: 93 FL (ref 80–100)
MONOCYTES # BLD AUTO: 0.5 X10*3/UL (ref 0.05–0.8)
MONOCYTES NFR BLD AUTO: 8.2 %
NEUTROPHILS # BLD AUTO: 4.6 X10*3/UL (ref 1.6–5.5)
NEUTROPHILS NFR BLD AUTO: 75 %
NON HDL CHOLESTEROL: 87 MG/DL (ref 0–149)
NRBC BLD-RTO: 0 /100 WBCS (ref 0–0)
PLATELET # BLD AUTO: 219 X10*3/UL (ref 150–450)
POTASSIUM SERPL-SCNC: 4.4 MMOL/L (ref 3.5–5.3)
PROT SERPL-MCNC: 6.3 G/DL (ref 6.4–8.2)
RBC # BLD AUTO: 4.08 X10*6/UL (ref 4–5.2)
SODIUM SERPL-SCNC: 143 MMOL/L (ref 136–145)
TRIGL SERPL-MCNC: 121 MG/DL (ref 0–149)
TSH SERPL-ACNC: 0.8 MIU/L (ref 0.44–3.98)
URATE SERPL-MCNC: 8.4 MG/DL (ref 2.3–6.7)
VIT B12 SERPL-MCNC: 235 PG/ML (ref 211–911)
VLDL: 24 MG/DL (ref 0–40)
WBC # BLD AUTO: 6.1 X10*3/UL (ref 4.4–11.3)

## 2024-06-17 PROCEDURE — 3075F SYST BP GE 130 - 139MM HG: CPT | Performed by: INTERNAL MEDICINE

## 2024-06-17 PROCEDURE — 1124F ACP DISCUSS-NO DSCNMKR DOCD: CPT | Performed by: INTERNAL MEDICINE

## 2024-06-17 PROCEDURE — 82746 ASSAY OF FOLIC ACID SERUM: CPT

## 2024-06-17 PROCEDURE — 84550 ASSAY OF BLOOD/URIC ACID: CPT

## 2024-06-17 PROCEDURE — 80053 COMPREHEN METABOLIC PANEL: CPT

## 2024-06-17 PROCEDURE — 82043 UR ALBUMIN QUANTITATIVE: CPT

## 2024-06-17 PROCEDURE — 99214 OFFICE O/P EST MOD 30 MIN: CPT | Performed by: INTERNAL MEDICINE

## 2024-06-17 PROCEDURE — 84443 ASSAY THYROID STIM HORMONE: CPT

## 2024-06-17 PROCEDURE — 82607 VITAMIN B-12: CPT

## 2024-06-17 PROCEDURE — 3078F DIAST BP <80 MM HG: CPT | Performed by: INTERNAL MEDICINE

## 2024-06-17 PROCEDURE — G2211 COMPLEX E/M VISIT ADD ON: HCPCS | Performed by: INTERNAL MEDICINE

## 2024-06-17 PROCEDURE — 1036F TOBACCO NON-USER: CPT | Performed by: INTERNAL MEDICINE

## 2024-06-17 PROCEDURE — 85025 COMPLETE CBC W/AUTO DIFF WBC: CPT

## 2024-06-17 PROCEDURE — 80061 LIPID PANEL: CPT

## 2024-06-17 PROCEDURE — 82306 VITAMIN D 25 HYDROXY: CPT

## 2024-06-17 PROCEDURE — 82570 ASSAY OF URINE CREATININE: CPT

## 2024-06-17 PROCEDURE — 1160F RVW MEDS BY RX/DR IN RCRD: CPT | Performed by: INTERNAL MEDICINE

## 2024-06-17 PROCEDURE — 1159F MED LIST DOCD IN RCRD: CPT | Performed by: INTERNAL MEDICINE

## 2024-06-17 PROCEDURE — 36415 COLL VENOUS BLD VENIPUNCTURE: CPT

## 2024-06-17 PROCEDURE — 81001 URINALYSIS AUTO W/SCOPE: CPT

## 2024-06-17 RX ORDER — CEPHALEXIN 500 MG/1
500 CAPSULE ORAL 2 TIMES DAILY
Qty: 20 CAPSULE | Refills: 0 | Status: SHIPPED | OUTPATIENT
Start: 2024-06-17 | End: 2024-06-27

## 2024-06-17 ASSESSMENT — ENCOUNTER SYMPTOMS
ABDOMINAL PAIN: 0
DIARRHEA: 0
WHEEZING: 0
TROUBLE SWALLOWING: 0
DIZZINESS: 0
TREMORS: 0
ANAL BLEEDING: 0
NECK PAIN: 0
BLOOD IN STOOL: 0
CHILLS: 0
EYE REDNESS: 0
COUGH: 0
RHINORRHEA: 0
PHOTOPHOBIA: 0
CHEST TIGHTNESS: 0
DYSURIA: 0
RECTAL PAIN: 0
COLOR CHANGE: 0
VOICE CHANGE: 0
NUMBNESS: 0
DIAPHORESIS: 0
NAUSEA: 0
WOUND: 0
FACIAL ASYMMETRY: 0
CHOKING: 0
ACTIVITY CHANGE: 0
ABDOMINAL DISTENTION: 0
DIFFICULTY URINATING: 0
NECK STIFFNESS: 0
SINUS PAIN: 0
FLANK PAIN: 0
SHORTNESS OF BREATH: 0
APPETITE CHANGE: 0
FREQUENCY: 0
ADENOPATHY: 0
CONSTIPATION: 0
LIGHT-HEADEDNESS: 0
FACIAL SWELLING: 0
SORE THROAT: 0
EYE PAIN: 0
MYALGIAS: 0
PALPITATIONS: 0
SPEECH DIFFICULTY: 0
POLYDIPSIA: 0
WEAKNESS: 0
POLYPHAGIA: 0
SEIZURES: 0
ARTHRALGIAS: 1
STRIDOR: 0
BACK PAIN: 0
HEADACHES: 0
EYE ITCHING: 0
BRUISES/BLEEDS EASILY: 0
HEMATURIA: 0
FATIGUE: 0
JOINT SWELLING: 0
SLEEP DISTURBANCE: 0
VOMITING: 0
EYE DISCHARGE: 0
SINUS PRESSURE: 0

## 2024-06-17 NOTE — PROGRESS NOTES
Subjective   Patient ID: Shila Venegas is a 84 y.o. female who presents for Follow-up (Pt present today for 4 month follow up. ls).    Patient presents for follow-up.  She has been compliant with her medications, diet and exercise.  She had a Mohs procedure done last week.  She is complaining of erythema and worsening swelling involving her right leg.  She reports some pain.  She otherwise feels okay.  She reports baseline arthritis symptoms.  She denies any headaches, no dizziness, no chest pain or shortness of breath.  She denies abdominal pain no nausea vomiting or diarrhea.         Review of Systems   Constitutional:  Negative for activity change, appetite change, chills, diaphoresis and fatigue.   HENT:  Negative for congestion, dental problem, drooling, ear discharge, ear pain, facial swelling, hearing loss, mouth sores, nosebleeds, postnasal drip, rhinorrhea, sinus pressure, sinus pain, sneezing, sore throat, tinnitus, trouble swallowing and voice change.    Eyes:  Negative for photophobia, pain, discharge, redness, itching and visual disturbance.   Respiratory:  Negative for cough, choking, chest tightness, shortness of breath, wheezing and stridor.    Cardiovascular:  Positive for leg swelling. Negative for chest pain and palpitations.   Gastrointestinal:  Negative for abdominal distention, abdominal pain, anal bleeding, blood in stool, constipation, diarrhea, nausea, rectal pain and vomiting.   Endocrine: Negative for cold intolerance, heat intolerance, polydipsia, polyphagia and polyuria.   Genitourinary:  Negative for decreased urine volume, difficulty urinating, dysuria, enuresis, flank pain, frequency, genital sores, hematuria and urgency.   Musculoskeletal:  Positive for arthralgias. Negative for back pain, gait problem, joint swelling, myalgias, neck pain and neck stiffness.   Skin:  Positive for rash. Negative for color change, pallor and wound.   Neurological:  Negative for dizziness, tremors,  seizures, syncope, facial asymmetry, speech difficulty, weakness, light-headedness, numbness and headaches.   Hematological:  Negative for adenopathy. Does not bruise/bleed easily.   Psychiatric/Behavioral:  Negative for sleep disturbance.        Objective   /76   Pulse 72   Temp 36.5 °C (97.7 °F)   Wt 73 kg (161 lb)   BMI 31.44 kg/m²     Physical Exam  Constitutional:       Appearance: Normal appearance.   Cardiovascular:      Rate and Rhythm: Normal rate and regular rhythm.      Heart sounds: No murmur heard.     No gallop.   Pulmonary:      Effort: No respiratory distress.      Breath sounds: No wheezing or rales.   Abdominal:      General: There is no distension.      Palpations: There is no mass.      Tenderness: There is no abdominal tenderness. There is no guarding.   Musculoskeletal:      Right lower leg: Edema (1+ edema with erythema in the right lower leg.  Healing surgical wound on lower leg.) present.      Left lower leg: No edema.   Neurological:      Mental Status: She is alert.         Assessment/Plan   Diagnoses and all orders for this visit:  Benign essential hypertension-low-salt diet and exercise  -     Uric Acid; Future  -     Urinalysis with Reflex Microscopic; Future  -     Albumin , Urine Random; Future  -     Comprehensive Metabolic Panel; Future  Mixed hyperlipidemia-check a fast lipid profile  -     Lipid Panel; Future  Malaise  -     CBC and Auto Differential; Future  -     TSH with reflex to Free T4 if abnormal; Future  Vitamin D deficiency  -     Vitamin D 25-Hydroxy,Total (for eval of Vitamin D levels); Future  Encounter for screening mammogram for malignant neoplasm of breast  -     BI mammo bilateral screening tomosynthesis; Future  Cellulitis of right lower extremity-we will treat with Keflex.  She will schedule appointment with dermatology and vascular  Coronary artery calcification-we will modify risk factors.  Health maintenance-she will get her shingles vaccine at the  pharmacy.  Colonoscopy later this year.  Dermatology appointment has been done.  Advance care planning discussed.

## 2024-06-17 NOTE — PATIENT INSTRUCTIONS
Please take medication as prescribed.  Follow-up in 1 week.  Go to emergency room symptoms worsen.  Obtain blood work and urine.  Schedule for dermatology.

## 2024-06-18 DIAGNOSIS — N30.00 ACUTE CYSTITIS WITHOUT HEMATURIA: Primary | ICD-10-CM

## 2024-06-18 LAB
APPEARANCE UR: ABNORMAL
BACTERIA #/AREA URNS AUTO: ABNORMAL /HPF
BILIRUB UR STRIP.AUTO-MCNC: NEGATIVE MG/DL
COLOR UR: ABNORMAL
CREAT UR-MCNC: 65.7 MG/DL (ref 20–320)
GLUCOSE UR STRIP.AUTO-MCNC: NORMAL MG/DL
KETONES UR STRIP.AUTO-MCNC: NEGATIVE MG/DL
LEUKOCYTE ESTERASE UR QL STRIP.AUTO: ABNORMAL
MICROALBUMIN UR-MCNC: 10.3 MG/L
MICROALBUMIN/CREAT UR: 15.7 UG/MG CREAT
MUCOUS THREADS #/AREA URNS AUTO: ABNORMAL /LPF
NITRITE UR QL STRIP.AUTO: ABNORMAL
PH UR STRIP.AUTO: 5.5 [PH]
PROT UR STRIP.AUTO-MCNC: NEGATIVE MG/DL
RBC # UR STRIP.AUTO: NEGATIVE /UL
RBC #/AREA URNS AUTO: ABNORMAL /HPF
SP GR UR STRIP.AUTO: 1.01
SQUAMOUS #/AREA URNS AUTO: ABNORMAL /HPF
UROBILINOGEN UR STRIP.AUTO-MCNC: NORMAL MG/DL
WBC #/AREA URNS AUTO: ABNORMAL /HPF

## 2024-06-24 ENCOUNTER — APPOINTMENT (OUTPATIENT)
Dept: PRIMARY CARE | Facility: CLINIC | Age: 84
End: 2024-06-24
Payer: MEDICARE

## 2024-06-24 VITALS — BODY MASS INDEX: 31.33 KG/M2 | WEIGHT: 160.4 LBS

## 2024-06-24 DIAGNOSIS — L03.115 CELLULITIS OF RIGHT LOWER EXTREMITY: ICD-10-CM

## 2024-06-24 DIAGNOSIS — N18.30 STAGE 3 CHRONIC KIDNEY DISEASE, UNSPECIFIED WHETHER STAGE 3A OR 3B CKD (MULTI): Primary | ICD-10-CM

## 2024-06-24 DIAGNOSIS — I10 BENIGN ESSENTIAL HYPERTENSION: ICD-10-CM

## 2024-06-24 DIAGNOSIS — E78.2 MIXED HYPERLIPIDEMIA: ICD-10-CM

## 2024-06-24 PROCEDURE — 99213 OFFICE O/P EST LOW 20 MIN: CPT | Performed by: INTERNAL MEDICINE

## 2024-06-24 PROCEDURE — 1160F RVW MEDS BY RX/DR IN RCRD: CPT | Performed by: INTERNAL MEDICINE

## 2024-06-24 PROCEDURE — 1126F AMNT PAIN NOTED NONE PRSNT: CPT | Performed by: INTERNAL MEDICINE

## 2024-06-24 PROCEDURE — 1036F TOBACCO NON-USER: CPT | Performed by: INTERNAL MEDICINE

## 2024-06-24 PROCEDURE — 1159F MED LIST DOCD IN RCRD: CPT | Performed by: INTERNAL MEDICINE

## 2024-06-24 ASSESSMENT — ENCOUNTER SYMPTOMS
POLYDIPSIA: 0
NUMBNESS: 0
SPEECH DIFFICULTY: 0
FREQUENCY: 0
ARTHRALGIAS: 1
CHILLS: 0
NECK PAIN: 0
EYE ITCHING: 0
MYALGIAS: 0
SINUS PAIN: 0
DYSURIA: 0
FATIGUE: 0
SORE THROAT: 0
EYE PAIN: 0
WHEEZING: 0
SEIZURES: 0
DIZZINESS: 0
COLOR CHANGE: 0
CONSTIPATION: 0
HEADACHES: 0
DIFFICULTY URINATING: 0
SHORTNESS OF BREATH: 0
ADENOPATHY: 0
ABDOMINAL PAIN: 0
PALPITATIONS: 0
PHOTOPHOBIA: 0
TROUBLE SWALLOWING: 0
ABDOMINAL DISTENTION: 0
COUGH: 0
ANAL BLEEDING: 0
VOMITING: 0
HEMATURIA: 0
RHINORRHEA: 0
JOINT SWELLING: 0
FACIAL ASYMMETRY: 0
WOUND: 1
LIGHT-HEADEDNESS: 0
EYE REDNESS: 0
BLOOD IN STOOL: 0
POLYPHAGIA: 0
ACTIVITY CHANGE: 0
TREMORS: 0
WEAKNESS: 0
BACK PAIN: 0
DIAPHORESIS: 0
EYE DISCHARGE: 0
CHOKING: 0
FLANK PAIN: 0
BRUISES/BLEEDS EASILY: 0
STRIDOR: 0
FACIAL SWELLING: 0
NECK STIFFNESS: 0
VOICE CHANGE: 0
SLEEP DISTURBANCE: 0
RECTAL PAIN: 0
SINUS PRESSURE: 0
NAUSEA: 0
APPETITE CHANGE: 0
DIARRHEA: 0
CHEST TIGHTNESS: 0

## 2024-06-24 ASSESSMENT — PAIN SCALES - GENERAL: PAINLEVEL: 0-NO PAIN

## 2024-06-24 NOTE — PATIENT INSTRUCTIONS
Please take medication as prescribed.  Keep appointment with dermatology tomorrow.  Follow-up in 3 months.

## 2024-06-24 NOTE — PROGRESS NOTES
Subjective   Patient ID: Shila Venegas is a 84 y.o. female who presents for Follow-up (On leg).    Patient presents for follow-up.  She reports improvement in her wound on her right leg.  She states there is less redness surrounding it and there is less swelling.  She reports the pain is improved.  She denies any headaches, no dizziness, no chest pain or shortness of breath.  She denies abdominal pain no nausea vomiting or diarrhea.  She reports baseline arthritis symptoms.         Review of Systems   Constitutional:  Negative for activity change, appetite change, chills, diaphoresis and fatigue.   HENT:  Negative for congestion, dental problem, drooling, ear discharge, ear pain, facial swelling, hearing loss, mouth sores, nosebleeds, postnasal drip, rhinorrhea, sinus pressure, sinus pain, sneezing, sore throat, tinnitus, trouble swallowing and voice change.    Eyes:  Negative for photophobia, pain, discharge, redness, itching and visual disturbance.   Respiratory:  Negative for cough, choking, chest tightness, shortness of breath, wheezing and stridor.    Cardiovascular:  Negative for chest pain, palpitations and leg swelling.   Gastrointestinal:  Negative for abdominal distention, abdominal pain, anal bleeding, blood in stool, constipation, diarrhea, nausea, rectal pain and vomiting.   Endocrine: Negative for cold intolerance, heat intolerance, polydipsia, polyphagia and polyuria.   Genitourinary:  Negative for decreased urine volume, difficulty urinating, dysuria, enuresis, flank pain, frequency, genital sores, hematuria and urgency.   Musculoskeletal:  Positive for arthralgias. Negative for back pain, gait problem, joint swelling, myalgias, neck pain and neck stiffness.   Skin:  Positive for rash and wound. Negative for color change and pallor.   Neurological:  Negative for dizziness, tremors, seizures, syncope, facial asymmetry, speech difficulty, weakness, light-headedness, numbness and headaches.    Hematological:  Negative for adenopathy. Does not bruise/bleed easily.   Psychiatric/Behavioral:  Negative for sleep disturbance.        Objective   Wt 72.8 kg (160 lb 6.4 oz)   BMI 31.33 kg/m²     Physical Exam  Constitutional:       Appearance: Normal appearance.   Cardiovascular:      Rate and Rhythm: Normal rate and regular rhythm.      Heart sounds: No murmur heard.     No gallop.   Pulmonary:      Effort: No respiratory distress.      Breath sounds: No wheezing or rales.   Abdominal:      General: There is no distension.      Palpations: There is no mass.      Tenderness: There is no abdominal tenderness. There is no guarding.   Musculoskeletal:      Right lower leg: No edema (Erythema right lower leg.  Decreased.  Healing area from biopsy.  No drainage).      Left lower leg: No edema.   Neurological:      Mental Status: She is alert.         Assessment/Plan   Diagnoses and all orders for this visit:  Stage 3 chronic kidney disease, unspecified whether stage 3a or 3b CKD (Multi)-she will get repeat creatinine.  Mixed hyperlipidemia-take medication as prescribed.  Benign essential hypertension-low-salt diet and exercise  Cellulitis of right lower extremity-symptoms are improved.  She will keep appointment with dermatology tomorrow.

## 2024-07-17 ENCOUNTER — HOSPITAL ENCOUNTER (OUTPATIENT)
Dept: RADIOLOGY | Facility: CLINIC | Age: 84
Discharge: HOME | End: 2024-07-17
Payer: MEDICARE

## 2024-07-17 VITALS — WEIGHT: 160 LBS | BODY MASS INDEX: 31.41 KG/M2 | HEIGHT: 60 IN

## 2024-07-17 DIAGNOSIS — Z12.31 ENCOUNTER FOR SCREENING MAMMOGRAM FOR MALIGNANT NEOPLASM OF BREAST: ICD-10-CM

## 2024-07-17 PROCEDURE — 77067 SCR MAMMO BI INCL CAD: CPT

## 2024-09-04 ENCOUNTER — APPOINTMENT (OUTPATIENT)
Dept: CARDIOLOGY | Facility: CLINIC | Age: 84
End: 2024-09-04
Payer: MEDICARE

## 2024-09-18 ENCOUNTER — OFFICE VISIT (OUTPATIENT)
Dept: CARDIOLOGY | Facility: CLINIC | Age: 84
End: 2024-09-18
Payer: MEDICARE

## 2024-09-18 VITALS
HEART RATE: 79 BPM | WEIGHT: 159.6 LBS | BODY MASS INDEX: 31.33 KG/M2 | RESPIRATION RATE: 18 BRPM | HEIGHT: 60 IN | SYSTOLIC BLOOD PRESSURE: 153 MMHG | DIASTOLIC BLOOD PRESSURE: 65 MMHG

## 2024-09-18 DIAGNOSIS — I87.2 VENOUS INSUFFICIENCY (CHRONIC) (PERIPHERAL): Primary | ICD-10-CM

## 2024-09-18 PROCEDURE — 1126F AMNT PAIN NOTED NONE PRSNT: CPT | Performed by: INTERNAL MEDICINE

## 2024-09-18 PROCEDURE — 1160F RVW MEDS BY RX/DR IN RCRD: CPT | Performed by: INTERNAL MEDICINE

## 2024-09-18 PROCEDURE — 3078F DIAST BP <80 MM HG: CPT | Performed by: INTERNAL MEDICINE

## 2024-09-18 PROCEDURE — 1036F TOBACCO NON-USER: CPT | Performed by: INTERNAL MEDICINE

## 2024-09-18 PROCEDURE — 3077F SYST BP >= 140 MM HG: CPT | Performed by: INTERNAL MEDICINE

## 2024-09-18 PROCEDURE — 99214 OFFICE O/P EST MOD 30 MIN: CPT | Performed by: INTERNAL MEDICINE

## 2024-09-18 PROCEDURE — 1159F MED LIST DOCD IN RCRD: CPT | Performed by: INTERNAL MEDICINE

## 2024-09-18 ASSESSMENT — PATIENT HEALTH QUESTIONNAIRE - PHQ9
SUM OF ALL RESPONSES TO PHQ9 QUESTIONS 1 AND 2: 0
1. LITTLE INTEREST OR PLEASURE IN DOING THINGS: NOT AT ALL
2. FEELING DOWN, DEPRESSED OR HOPELESS: NOT AT ALL

## 2024-09-18 ASSESSMENT — PAIN SCALES - GENERAL: PAINLEVEL: 0-NO PAIN

## 2024-09-18 NOTE — PROGRESS NOTES
OUTPATIENT FOLLOW-UP -  VASCULAR MEDICINE    DOS: 9/18/2024  Last seen:    5/1/2024    REQUESTING PHYSICIAN:  Dr. Ariel Martínez MD PCP    REASON FOR FOLLOW-UP:  here for follow up CVI    HISTORY OF PRESENT ILLNESS:     83 yo lady here for follow up CVI. This morning legs are doing well. No swelling. Reports in June has a skin lesion removed - this was associated with pain and more swelling and redness. Was wearing her compression intermittently bc of the swelling, drainage etc. Was also out of town, more active traveling etc during this timeframe.      REVIEW OF SYSTEMS:     weight is stable  + sores, ulcers, rashes, +skin lesions  + edema, no calf pain    PHYSICAL EXAMINATION:  /65 (BP Location: Right arm, Patient Position: Sitting)   Pulse 79   Resp 18   Ht 1.524 m (5')   Wt 72.4 kg (159 lb 9.6 oz)   BMI 31.17 kg/m²      Gen: Appears well, NAD  Ext: no edema, nontender  Skin: good condition without wounds or lesions  Pulses: DP 1+; PT 1+  Mood and affect appropriate    ADDITIONAL DATA:   Wearing 20-30mmHg compression. Right calf:  40.0cm  Left calf:  38.5cm    ASSESSMENT/PLAN:    here for follow up CVI - using 20-30 mmHg stocking. Has prev had ablation with Dr. Vera 2018. Suspect the intermittent swelling (worse in the summer etc) is likely mild CVI. Recent ECHO moderate TR and elevated RVSP and this may be a future target. Saw Dr. Jimenez - recs: Echo 10/23 with mod AVS and mod TR. Can consider echo every 2-3 years (earlier for new or worsened symptoms).     Discussed continue the current compression. Mild salt restriction recommended. Follow up spring.

## 2024-10-28 ENCOUNTER — APPOINTMENT (OUTPATIENT)
Dept: OPHTHALMOLOGY | Facility: CLINIC | Age: 84
End: 2024-10-28
Payer: MEDICARE

## 2024-10-28 DIAGNOSIS — H52.4 PRESBYOPIA: ICD-10-CM

## 2024-10-28 DIAGNOSIS — H02.88B MEIBOMIAN GLAND DYSFUNCTION (MGD) OF UPPER AND LOWER EYELID OF LEFT EYE: ICD-10-CM

## 2024-10-28 DIAGNOSIS — H25.813 COMBINED FORM OF SENILE CATARACT OF BOTH EYES: Primary | ICD-10-CM

## 2024-10-28 DIAGNOSIS — H02.106 PUNCTAL ECTROPIONS OF BOTH EYES: ICD-10-CM

## 2024-10-28 DIAGNOSIS — H02.103 PUNCTAL ECTROPIONS OF BOTH EYES: ICD-10-CM

## 2024-10-28 DIAGNOSIS — H02.88A MEIBOMIAN GLAND DYSFUNCTION (MGD) OF UPPER AND LOWER EYELID OF RIGHT EYE: ICD-10-CM

## 2024-10-28 DIAGNOSIS — H16.223 KERATOCONJUNCTIVITIS SICCA OF BOTH EYES NOT DUE TO SJOGREN'S SYNDROME: ICD-10-CM

## 2024-10-28 PROCEDURE — 99214 OFFICE O/P EST MOD 30 MIN: CPT | Performed by: OPTOMETRIST

## 2024-10-28 ASSESSMENT — ENCOUNTER SYMPTOMS
NEUROLOGICAL NEGATIVE: 0
MUSCULOSKELETAL NEGATIVE: 0
CONSTITUTIONAL NEGATIVE: 0
HEMATOLOGIC/LYMPHATIC NEGATIVE: 0
EYES NEGATIVE: 1
PSYCHIATRIC NEGATIVE: 0
RESPIRATORY NEGATIVE: 0
GASTROINTESTINAL NEGATIVE: 0
ALLERGIC/IMMUNOLOGIC NEGATIVE: 0
CARDIOVASCULAR NEGATIVE: 1
ENDOCRINE NEGATIVE: 0

## 2024-10-28 ASSESSMENT — REFRACTION_MANIFEST
OD_SPHERE: -2.25
OD_AXIS: 180
OS_SPHERE: -0.50
OS_AXIS: 010
OS_CYLINDER: +1.00
OD_ADD: +2.75
OS_ADD: +2.75
OD_CYLINDER: +0.75

## 2024-10-28 ASSESSMENT — CONF VISUAL FIELD
OS_INFERIOR_NASAL_RESTRICTION: 0
OS_SUPERIOR_TEMPORAL_RESTRICTION: 0
OD_SUPERIOR_NASAL_RESTRICTION: 0
OS_INFERIOR_TEMPORAL_RESTRICTION: 0
OD_INFERIOR_TEMPORAL_RESTRICTION: 0
OS_SUPERIOR_NASAL_RESTRICTION: 0
METHOD: COUNTING FINGERS
OS_NORMAL: 1
OD_SUPERIOR_TEMPORAL_RESTRICTION: 0
OD_INFERIOR_NASAL_RESTRICTION: 0
OD_NORMAL: 1

## 2024-10-28 ASSESSMENT — CUP TO DISC RATIO
OD_RATIO: 0.4
OS_RATIO: 0.35

## 2024-10-28 ASSESSMENT — VISUAL ACUITY
OD_PH_SC: 20/40
METHOD: SNELLEN - LINEAR
OD_BAT_MED: 20/50
OS_BAT_MED: 20/50
OD_SC: 20/50
OS_SC+: -2
OS_SC: 20/30
OD_PH_SC+: -2

## 2024-10-28 ASSESSMENT — EXTERNAL EXAM - LEFT EYE: OS_EXAM: NORMAL

## 2024-10-28 ASSESSMENT — TONOMETRY
OS_IOP_MMHG: 14
OD_IOP_MMHG: 14
IOP_METHOD: GOLDMANN APPLANATION

## 2024-10-28 ASSESSMENT — EXTERNAL EXAM - RIGHT EYE: OD_EXAM: NORMAL

## 2024-11-27 ENCOUNTER — APPOINTMENT (OUTPATIENT)
Dept: PRIMARY CARE | Facility: CLINIC | Age: 84
End: 2024-11-27
Payer: MEDICARE

## 2024-11-27 DIAGNOSIS — M48.061 SPINAL STENOSIS OF LUMBAR REGION WITHOUT NEUROGENIC CLAUDICATION: ICD-10-CM

## 2024-11-27 DIAGNOSIS — Z12.11 COLON CANCER SCREENING: ICD-10-CM

## 2024-11-27 DIAGNOSIS — N18.30 STAGE 3 CHRONIC KIDNEY DISEASE, UNSPECIFIED WHETHER STAGE 3A OR 3B CKD (MULTI): ICD-10-CM

## 2024-11-27 DIAGNOSIS — E78.2 MIXED HYPERLIPIDEMIA: ICD-10-CM

## 2024-11-27 DIAGNOSIS — I10 BENIGN ESSENTIAL HYPERTENSION: ICD-10-CM

## 2024-11-27 DIAGNOSIS — Z78.0 MENOPAUSE: Primary | ICD-10-CM

## 2024-11-27 PROCEDURE — 1160F RVW MEDS BY RX/DR IN RCRD: CPT | Performed by: INTERNAL MEDICINE

## 2024-11-27 PROCEDURE — 1159F MED LIST DOCD IN RCRD: CPT | Performed by: INTERNAL MEDICINE

## 2024-11-27 PROCEDURE — 99213 OFFICE O/P EST LOW 20 MIN: CPT | Performed by: INTERNAL MEDICINE

## 2024-11-27 PROCEDURE — G0439 PPPS, SUBSEQ VISIT: HCPCS | Performed by: INTERNAL MEDICINE

## 2024-11-27 PROCEDURE — 1170F FXNL STATUS ASSESSED: CPT | Performed by: INTERNAL MEDICINE

## 2024-11-27 PROCEDURE — 1036F TOBACCO NON-USER: CPT | Performed by: INTERNAL MEDICINE

## 2024-11-27 RX ORDER — OMEPRAZOLE 40 MG/1
40 CAPSULE, DELAYED RELEASE ORAL DAILY
Qty: 90 CAPSULE | Refills: 3 | Status: SHIPPED | OUTPATIENT
Start: 2024-11-27

## 2024-11-27 ASSESSMENT — ENCOUNTER SYMPTOMS
FREQUENCY: 0
WOUND: 0
ABDOMINAL PAIN: 0
LIGHT-HEADEDNESS: 0
COUGH: 1
HEADACHES: 0
DIAPHORESIS: 0
EYE DISCHARGE: 0
DIARRHEA: 1
DIFFICULTY URINATING: 0
APPETITE CHANGE: 0
HEMATURIA: 0
TREMORS: 0
STRIDOR: 0
SHORTNESS OF BREATH: 0
NAUSEA: 0
POLYPHAGIA: 0
CHILLS: 0
WHEEZING: 0
SLEEP DISTURBANCE: 0
POLYDIPSIA: 0
FACIAL SWELLING: 0
COLOR CHANGE: 0
NUMBNESS: 0
NECK STIFFNESS: 0
CHOKING: 0
CHEST TIGHTNESS: 0
RHINORRHEA: 0
PALPITATIONS: 0
DYSURIA: 0
ANAL BLEEDING: 0
SPEECH DIFFICULTY: 0
VOICE CHANGE: 0
WEAKNESS: 0
CONSTIPATION: 0
VOMITING: 0
MYALGIAS: 0
SINUS PAIN: 0
RECTAL PAIN: 0
TROUBLE SWALLOWING: 0
EYE ITCHING: 0
FLANK PAIN: 0
ACTIVITY CHANGE: 0
FATIGUE: 0
BRUISES/BLEEDS EASILY: 0
SORE THROAT: 0
SINUS PRESSURE: 0
DIZZINESS: 0
FACIAL ASYMMETRY: 0
PHOTOPHOBIA: 0
NECK PAIN: 0
ARTHRALGIAS: 1
EYE PAIN: 0
ADENOPATHY: 0
EYE REDNESS: 0
BACK PAIN: 1
JOINT SWELLING: 0
SEIZURES: 0
BLOOD IN STOOL: 0
ABDOMINAL DISTENTION: 0

## 2024-11-27 ASSESSMENT — ACTIVITIES OF DAILY LIVING (ADL)
MANAGING_FINANCES: INDEPENDENT
DRESSING: INDEPENDENT
BATHING: INDEPENDENT
DOING_HOUSEWORK: INDEPENDENT
GROCERY_SHOPPING: INDEPENDENT
TAKING_MEDICATION: INDEPENDENT

## 2024-11-27 NOTE — PATIENT INSTRUCTIONS
HPI





- HPI


Patient complains to provider of: Dysuria


Time Seen by Provider: 12/25/19 13:21


Onset: This morning


Onset/Duration: Sudden


Quality of pain: Burning


Pain Level: 2


Context: 





This 20-year-old female presents emergency department with complaints of urinary

frequency urgency with decreased output that started this morning when she woke 

up.  Reports she has a history of UTIs in her provider wants her to go see a 

urologist.  Patient is visiting the area.  She denies fever vomiting diarrhea.  

Denies abdominal pain.  Denies vaginal discharge.  She reports her urine smelled

fairly strong this morning


Associated Symptoms: None


Exacerbated by: Denies


Relieved by: Denies


Similar symptoms previously: Yes


Recently seen / treated by doctor: No





- REPRODUCTIVE


LMP: 12/11/19


Reproductive: DENIES: Pregnant:





Past Medical History





- General


Information source: Patient


Last Menstrual Period: 12/11/19





- Social History


Smoking Status: Never Smoker


Chew tobacco use (# tins/day): No


Frequency of alcohol use: Occasional


Drug Abuse: None


Occupation: student


Family History: None


Patient has suicidal ideation: No


Patient has homicidal ideation: No





- Medical History


Medical History: Negative


Surgical Hx: Negative





Vertical Provider Document





- CONSTITUTIONAL


Agree With Documented VS: Yes


Exam Limitations: No Limitations


General Appearance: WD/WN, No Apparent Distress





- INFECTION CONTROL


TRAVEL OUTSIDE OF THE [place] IN LAST 30 DAYS: No





- HEENT


HEENT: Atraumatic, Normocephalic.  negative: Conjuctival Injection





- NECK


Neck: Normal Inspection, Supple.  negative: Lymphadenopathy-Left, 

Lymphadenopathy-Right





- RESPIRATORY


Respiratory: Breath Sounds Normal, No Respiratory Distress





- CARDIOVASCULAR


Cardiovascular: Regular Rate, Regular Rhythm





- GI/ABDOMEN


Gastrointestinal: Abdomen Soft, Abdomen Non-Tender





- BACK


Back: negative: CVA Tenderness-Right, CVA Tenderness-Left





- MUSCULOSKELETAL/EXTREMETIES


Musculoskeletal/Extremeties: MAEW, FROM





- NEURO


Level of Consciousness: Awake, Alert, Appropriate


Motor/Sensory: No Motor Deficit





- DERM


Integumentary: Warm, Dry





Course





- Re-evaluation


Re-evalutation: 





12/25/19 13:27


20-year female [facility] emergency department with complaints of urinary frequency 

urgency and decreased output.  Reports symptoms started today.  Reports her 

urine smells really strong.  Reports it feels like another urinary tract 

infection.  She reports she was treated for UTI in October.  She reports she 

gets UTIs frequently and her primary care provider wants her to see a urologist.


12/25/19 15:50


UA shows large leukocytes with bacteria and WBCs.  Patient was treated with 

Macrobid.  She was instructed to monitor her temperature return to ED for fever 

back pain concerns.  She was also instructed on prescription for Pyridium and 

Macrobid.  Instructed to follow-up with her primary care provider and urologist.

 She verbalized understanding to all instructions.


                                        











Urine Color  YELLOW   12/25/19  13:29    


 


Urine Appearance  CLOUDY   12/25/19  13:29    


 


Urine pH  8.0  (5.0-9.0)   12/25/19  13:29    


 


Ur Specific Gravity  1.005   12/25/19  13:29    


 


Urine Protein  30 mg/dL (NEGATIVE)  H  12/25/19  13:29    


 


Urine Glucose (UA)  NEGATIVE mg/dL (NEGATIVE)   12/25/19  13:29    


 


Urine Ketones  NEGATIVE mg/dL (NEGATIVE)   12/25/19  13:29    


 


Urine Blood  MODERATE  (NEGATIVE)  H  12/25/19  13:29    


 


Urine RBC (Auto)  155 /HPF  12/25/19  13:29    




















- Vital Signs


Vital signs: 


                                        











Temp Pulse Resp BP Pulse Ox


 


 97.4 F   83   20   145/74 H  99 


 


 12/25/19 13:04  12/25/19 13:04  12/25/19 13:04  12/25/19 13:04  12/25/19 13:04














Discharge





- Discharge


Clinical Impression: 


 Dysuria





UTI (urinary tract infection)


Qualifiers:


 Urinary tract infection type: site unspecified Hematuria presence: with 

hematuria Qualified Code(s): N39.0 - Urinary tract infection, site not specified





Condition: Stable


Disposition: HOME, SELF-CARE


Instructions:  Nitrofurantoin ([facility]), Urinary Anesthetic Agent ([facility]), Urinary 

Tract Infection ([facility])


Additional Instructions: 


*You have been evaluated for pain while voiding, UTI


*Take medication as prescribed


*Push fluids


*Follow up with your primary care provider within 1 week for recheck


 *Return to ED for worsening condition, changes, needs











Monitor your blood pressure. Your blood pressure was elevated today.  This may 

be because you were anxious, in pain or because you need medication.  It is 

important to follow up with your primary care provider for full evaluation.


Prescriptions: 


Nitrofurantoin/Nitrofuran Mac [Macrobid 100 mg Capsule] 100 mg PO BID #20 

capsule


Phenazopyridine HCl [Pyridium 200 mg Tablet] 200 mg PO TID #15 tablet


Forms:  Elevated Blood Pressure Please take medication as prescribed.  Follow-up in 6 weeks.

## 2024-11-27 NOTE — PROGRESS NOTES
Subjective   Patient ID: Shila Venegas is a 84 y.o. female who presents for Medicare Annual Wellness Visit Subsequent.    Patient presents for wellness exam and follow-up.  She has been compliant with her medications, diet and exercise.  She reports baseline back pain.  She denies any headaches, no dizziness but does complain of allergy symptoms.  Does complain of occasional nonproductive cough.  She denies abdominal pain no nausea vomiting but does complain of occasional loose stools.  She reports occasional reflux symptoms.  She reports baseline lower extremity swelling.  She is seeing dermatology for her skin lesion         Review of Systems   Constitutional:  Negative for activity change, appetite change, chills, diaphoresis and fatigue.   HENT:  Negative for congestion, dental problem, drooling, ear discharge, ear pain, facial swelling, hearing loss, mouth sores, nosebleeds, postnasal drip, rhinorrhea, sinus pressure, sinus pain, sneezing, sore throat, tinnitus, trouble swallowing and voice change.    Eyes:  Negative for photophobia, pain, discharge, redness, itching and visual disturbance.   Respiratory:  Positive for cough. Negative for choking, chest tightness, shortness of breath, wheezing and stridor.    Cardiovascular:  Positive for leg swelling. Negative for chest pain and palpitations.   Gastrointestinal:  Positive for diarrhea. Negative for abdominal distention, abdominal pain, anal bleeding, blood in stool, constipation, nausea, rectal pain and vomiting.   Endocrine: Negative for cold intolerance, heat intolerance, polydipsia, polyphagia and polyuria.   Genitourinary:  Negative for decreased urine volume, difficulty urinating, dysuria, enuresis, flank pain, frequency, genital sores, hematuria and urgency.   Musculoskeletal:  Positive for arthralgias and back pain. Negative for gait problem, joint swelling, myalgias, neck pain and neck stiffness.   Skin:  Positive for rash. Negative for color change,  pallor and wound.   Neurological:  Negative for dizziness, tremors, seizures, syncope, facial asymmetry, speech difficulty, weakness, light-headedness, numbness and headaches.   Hematological:  Negative for adenopathy. Does not bruise/bleed easily.   Psychiatric/Behavioral:  Negative for sleep disturbance.        Objective   There were no vitals taken for this visit.    Physical Exam  Constitutional:       Appearance: Normal appearance.   Pulmonary:      Effort: Pulmonary effort is normal.   Neurological:      Mental Status: She is alert.   Psychiatric:         Mood and Affect: Mood normal.         Behavior: Behavior normal.         Thought Content: Thought content normal.         Judgment: Judgment normal.         Assessment/Plan   Diagnoses and all orders for this visit:  Menopause  -     XR DEXA bone density; Future  Colon cancer screening  -     omeprazole (PriLOSEC) 40 mg DR capsule; Take 1 capsule (40 mg) by mouth once daily. LAST REFILL TILL APPT IS MADE  Benign essential hypertension-low-salt diet and exercise take medication as prescribed  Mixed hyperlipidemia-we will continue with statin  Stage 3 chronic kidney disease, unspecified whether stage 3a or 3b CKD (Multi)-she will recheck her creatinine.  Spinal stenosis of lumbar region without neurogenic claudication-stable symptoms.  Reflux-stable symptoms.  Dermatitis-she will follow-up with dermatology.  DJD-stable symptoms.  Health maintenance-she does not want further colonoscopies.  Schedule appointment with GI.  Mammogram has been done.  Will schedule a bone density.  Eye, dental and dermatology appointments have been done.  She will get her RSV and COVID-vaccine at the pharmacy.

## 2024-11-29 ENCOUNTER — APPOINTMENT (OUTPATIENT)
Dept: PRIMARY CARE | Facility: CLINIC | Age: 84
End: 2024-11-29
Payer: MEDICARE

## 2024-12-07 DIAGNOSIS — I10 BENIGN ESSENTIAL HYPERTENSION: ICD-10-CM

## 2024-12-09 RX ORDER — METOPROLOL SUCCINATE 25 MG/1
12.5 TABLET, EXTENDED RELEASE ORAL DAILY
Qty: 45 TABLET | Refills: 3 | Status: SHIPPED | OUTPATIENT
Start: 2024-12-09

## 2024-12-12 ENCOUNTER — HOSPITAL ENCOUNTER (OUTPATIENT)
Dept: RADIOLOGY | Facility: CLINIC | Age: 84
Discharge: HOME | End: 2024-12-12
Payer: MEDICARE

## 2024-12-12 ENCOUNTER — OFFICE VISIT (OUTPATIENT)
Dept: URGENT CARE | Age: 84
End: 2024-12-12
Payer: MEDICARE

## 2024-12-12 VITALS
DIASTOLIC BLOOD PRESSURE: 77 MMHG | SYSTOLIC BLOOD PRESSURE: 143 MMHG | HEART RATE: 81 BPM | RESPIRATION RATE: 16 BRPM | OXYGEN SATURATION: 95 % | TEMPERATURE: 97.7 F

## 2024-12-12 DIAGNOSIS — G89.29 CHRONIC LEFT-SIDED LOW BACK PAIN WITHOUT SCIATICA: Primary | ICD-10-CM

## 2024-12-12 DIAGNOSIS — M54.50 LOW BACK PAIN, UNSPECIFIED BACK PAIN LATERALITY, UNSPECIFIED CHRONICITY, UNSPECIFIED WHETHER SCIATICA PRESENT: ICD-10-CM

## 2024-12-12 DIAGNOSIS — M54.50 CHRONIC LEFT-SIDED LOW BACK PAIN WITHOUT SCIATICA: Primary | ICD-10-CM

## 2024-12-12 DIAGNOSIS — M25.552 POSTERIOR PAIN OF HIP, LEFT: ICD-10-CM

## 2024-12-12 DIAGNOSIS — Z87.81 HISTORY OF FEMUR FRACTURE: ICD-10-CM

## 2024-12-12 LAB
POC APPEARANCE, URINE: CLEAR
POC BILIRUBIN, URINE: NEGATIVE
POC BLOOD, URINE: NEGATIVE
POC COLOR, URINE: YELLOW
POC GLUCOSE, URINE: NEGATIVE MG/DL
POC KETONES, URINE: NEGATIVE MG/DL
POC LEUKOCYTES, URINE: ABNORMAL
POC NITRITE,URINE: NEGATIVE
POC PH, URINE: 6 PH
POC PROTEIN, URINE: NEGATIVE MG/DL
POC SPECIFIC GRAVITY, URINE: 1.02
POC UROBILINOGEN, URINE: 0.2 EU/DL

## 2024-12-12 PROCEDURE — 3078F DIAST BP <80 MM HG: CPT | Performed by: FAMILY MEDICINE

## 2024-12-12 PROCEDURE — 72110 X-RAY EXAM L-2 SPINE 4/>VWS: CPT | Performed by: RADIOLOGY

## 2024-12-12 PROCEDURE — 1160F RVW MEDS BY RX/DR IN RCRD: CPT | Performed by: FAMILY MEDICINE

## 2024-12-12 PROCEDURE — 3077F SYST BP >= 140 MM HG: CPT | Performed by: FAMILY MEDICINE

## 2024-12-12 PROCEDURE — 73552 X-RAY EXAM OF FEMUR 2/>: CPT | Mod: LEFT SIDE | Performed by: RADIOLOGY

## 2024-12-12 PROCEDURE — 72110 X-RAY EXAM L-2 SPINE 4/>VWS: CPT

## 2024-12-12 PROCEDURE — 73552 X-RAY EXAM OF FEMUR 2/>: CPT | Mod: LT

## 2024-12-12 PROCEDURE — 1125F AMNT PAIN NOTED PAIN PRSNT: CPT | Performed by: FAMILY MEDICINE

## 2024-12-12 PROCEDURE — 81003 URINALYSIS AUTO W/O SCOPE: CPT | Performed by: FAMILY MEDICINE

## 2024-12-12 PROCEDURE — 1159F MED LIST DOCD IN RCRD: CPT | Performed by: FAMILY MEDICINE

## 2024-12-12 PROCEDURE — 73502 X-RAY EXAM HIP UNI 2-3 VIEWS: CPT | Mod: LEFT SIDE | Performed by: RADIOLOGY

## 2024-12-12 PROCEDURE — 72202 X-RAY EXAM SI JOINTS 3/> VWS: CPT

## 2024-12-12 PROCEDURE — 1036F TOBACCO NON-USER: CPT | Performed by: FAMILY MEDICINE

## 2024-12-12 PROCEDURE — 72202 X-RAY EXAM SI JOINTS 3/> VWS: CPT | Performed by: RADIOLOGY

## 2024-12-12 PROCEDURE — 99214 OFFICE O/P EST MOD 30 MIN: CPT | Performed by: FAMILY MEDICINE

## 2024-12-12 PROCEDURE — 73502 X-RAY EXAM HIP UNI 2-3 VIEWS: CPT | Mod: LT

## 2024-12-12 RX ORDER — PREDNISONE 10 MG/1
10 TABLET ORAL ONCE
Status: COMPLETED | OUTPATIENT
Start: 2024-12-12 | End: 2024-12-12

## 2024-12-12 RX ORDER — METHYLPREDNISOLONE 4 MG/1
TABLET ORAL
Qty: 21 TABLET | Refills: 0 | Status: SHIPPED | OUTPATIENT
Start: 2024-12-12 | End: 2024-12-18

## 2024-12-12 ASSESSMENT — PAIN SCALES - GENERAL: PAINLEVEL_OUTOF10: 8

## 2024-12-12 NOTE — PROGRESS NOTES
HPI:  Patient states that she has hx/o back pain on the left side for the past 3 years.  Pt states that she was seeing pain management and had injections in the past.  Pt states that for the past 3 days she has pain in the lower back on the left side that radiates to the LLE.  Pt states that she has tingling in the left LE, not shooting pain.  No weakness or numbness in her leg.  No recent injury.  Pt took Motrin yesterday with pain relief.  Pt has appt with pain management next month.  Pt states that she had femur fx in the past and has a jona in; pt would like to have xray done to check that jona is intact; pt had to have it replaced before.  Pain in the back is throbbing and 8/10 in severity.           ROS:  No bowel/bladder trouble  No weakness in LE  No hx/o injury    PE:    A&O x3  NCAT  Normal respiratory effort   no cva tndop  +tndop over left SI region  MOEx4  No focal deficit  Judgement normal  No rash in the area of pain    Results:  Xray lumbar spine/left hip and si joint and left femur:pending     A/P:   Lower back pain   Chronic back pain    We will call you with xray results if you need further treatment.  Ice.  Stretching.  Rest.  Topical muscle rub/biofreeze.  Tylenol as needed for pain.  Monitor blood pressure and blood sugar when on steroids.  Follow up with pain specialist for further evaluation as scheduled.  Keep a diary of symptoms.  Go to the ER if starts getting worse.

## 2024-12-20 DIAGNOSIS — I10 BENIGN ESSENTIAL HYPERTENSION: ICD-10-CM

## 2024-12-20 DIAGNOSIS — E78.5 HYPERLIPIDEMIA, UNSPECIFIED HYPERLIPIDEMIA TYPE: ICD-10-CM

## 2024-12-21 RX ORDER — ATORVASTATIN CALCIUM 10 MG/1
10 TABLET, FILM COATED ORAL DAILY
Qty: 90 TABLET | Refills: 3 | Status: SHIPPED | OUTPATIENT
Start: 2024-12-21

## 2024-12-21 RX ORDER — LOSARTAN POTASSIUM 25 MG/1
25 TABLET ORAL DAILY
Qty: 90 TABLET | Refills: 3 | Status: SHIPPED | OUTPATIENT
Start: 2024-12-21

## 2025-01-06 ENCOUNTER — APPOINTMENT (OUTPATIENT)
Dept: ORTHOPEDIC SURGERY | Facility: CLINIC | Age: 85
End: 2025-01-06
Payer: MEDICARE

## 2025-01-06 VITALS — WEIGHT: 159 LBS | HEIGHT: 60 IN | BODY MASS INDEX: 31.22 KG/M2

## 2025-01-06 DIAGNOSIS — M17.11 PRIMARY OSTEOARTHRITIS OF RIGHT KNEE: Primary | ICD-10-CM

## 2025-01-06 PROCEDURE — 1036F TOBACCO NON-USER: CPT | Performed by: PHYSICIAN ASSISTANT

## 2025-01-06 PROCEDURE — 1159F MED LIST DOCD IN RCRD: CPT | Performed by: PHYSICIAN ASSISTANT

## 2025-01-06 PROCEDURE — 20610 DRAIN/INJ JOINT/BURSA W/O US: CPT | Performed by: PHYSICIAN ASSISTANT

## 2025-01-06 PROCEDURE — 99212 OFFICE O/P EST SF 10 MIN: CPT | Performed by: PHYSICIAN ASSISTANT

## 2025-01-06 RX ORDER — LIDOCAINE HYDROCHLORIDE 10 MG/ML
4 INJECTION, SOLUTION INFILTRATION; PERINEURAL
Status: COMPLETED | OUTPATIENT
Start: 2025-01-06 | End: 2025-01-06

## 2025-01-06 RX ORDER — TRIAMCINOLONE ACETONIDE 40 MG/ML
40 INJECTION, SUSPENSION INTRA-ARTICULAR; INTRAMUSCULAR
Status: COMPLETED | OUTPATIENT
Start: 2025-01-06 | End: 2025-01-06

## 2025-01-06 RX ADMIN — LIDOCAINE HYDROCHLORIDE 4 ML: 10 INJECTION, SOLUTION INFILTRATION; PERINEURAL at 11:52

## 2025-01-06 RX ADMIN — TRIAMCINOLONE ACETONIDE 40 MG: 40 INJECTION, SUSPENSION INTRA-ARTICULAR; INTRAMUSCULAR at 11:52

## 2025-01-06 ASSESSMENT — PAIN DESCRIPTION - DESCRIPTORS: DESCRIPTORS: ACHING;SORE;SHOOTING

## 2025-01-06 ASSESSMENT — PAIN SCALES - GENERAL: PAINLEVEL_OUTOF10: 5 - MODERATE PAIN

## 2025-01-06 ASSESSMENT — PAIN - FUNCTIONAL ASSESSMENT: PAIN_FUNCTIONAL_ASSESSMENT: 0-10

## 2025-01-06 NOTE — PROGRESS NOTES
Shila returns to clinic today for recurrent pain of her right knee. She has a history of right knee osteoarthritis. History of left knee arthroplasty. She is requesting an injection today for her right knee.  She is having some issues with left sciatica pain that is causing her some difficulty as well     Patient's self reported past medical history, medications, allergies, surgical history, family and social history as well as a 10 point review of systems has been documented in the new patient intake form and scanned into the patient's electronic medical record. Pertinent findings are documented in the HPI.     Physical Examination Findings:  Constitutional: Appears well-developed and well-nourished.  Head: Normocephalic and atraumatic.  Eyes: Pupils are equal and round.  Cardiovascular: Intact distal pulses.   Respiratory: Effort normal. No respiratory distress.  Neurologic: Alert and oriented to person, place, and time.  Skin: Skin is warm and dry.  Hematologic / Lymphatic: No lymphedema, lymphangitis.  Psychiatric: normal mood and affect. Behavior is normal.   Musculoskeletal: Right knee with tenderness over medial and lateral joint line. Positive retropatellar crepitus. Calf is supple and nontender. Range of motion 0-100 degrees. No effusion.     Impression: Right knee osteoarthritis     Plan: Steroid injection was performed today upon patient request. Injection was given and tolerated well. She will continue to monitor symptoms and return to our office as needed.     Patient ID: Shila Venegas is a 84 y.o. female.    L Inj/Asp: R knee on 1/6/2025 11:52 AM  Indications: pain  Details: 22 G needle, lateral approach  Medications: 4 mL lidocaine 10 mg/mL (1 %); 40 mg triamcinolone acetonide 40 mg/mL  Procedure, treatment alternatives, risks and benefits explained, specific risks discussed. Immediately prior to procedure a time out was called to verify the correct patient, procedure, equipment, support staff and  site/side marked as required.         Guera Ariza PA-C  Department of Orthopaedic Surgery  [facility]     Dictation performed with the use of voice recognition software. Syntax and grammatical errors may exist.

## 2025-01-07 ENCOUNTER — OFFICE VISIT (OUTPATIENT)
Dept: PAIN MEDICINE | Facility: HOSPITAL | Age: 85
End: 2025-01-07
Payer: MEDICARE

## 2025-01-07 DIAGNOSIS — M48.062 SPINAL STENOSIS OF LUMBAR REGION WITH NEUROGENIC CLAUDICATION: ICD-10-CM

## 2025-01-07 PROCEDURE — 1125F AMNT PAIN NOTED PAIN PRSNT: CPT | Performed by: ANESTHESIOLOGY

## 2025-01-07 PROCEDURE — 99214 OFFICE O/P EST MOD 30 MIN: CPT | Performed by: ANESTHESIOLOGY

## 2025-01-07 PROCEDURE — 1159F MED LIST DOCD IN RCRD: CPT | Performed by: ANESTHESIOLOGY

## 2025-01-07 ASSESSMENT — PAIN SCALES - GENERAL: PAINLEVEL_OUTOF10: 7

## 2025-01-07 NOTE — PROGRESS NOTES
Subjective   Patient ID: Shila Venegas is a 84 y.o. female presenting with complaint of lower back pain with radicular symptoms into the left lower extremity.      HPI:   Shila Venegas is a 84 y.o. female presenting with complaint of lower back pain with radicular symptoms into the left lower extremity.  Patient says that her pain started to become worse during November 2024.  Patient describes her pain currently is about 5 out of 10 on average in intensity and achy in nature.  Pain at times could fluctuate up to an 8 out of 10.  Patient has previously had several injections through pain management including a transforaminal left-sided L3, L4 lumbar transforaminal epidural steroid injection on November 21, 2022.  Patient says that she felt she had good relief of her symptoms after her injection and lasted for a while.  She says that relief was probably more than 75% and lasted 4 to 5 months or more.  The patient says she feels her pain has become a little bit improved in the past month.  Patient has reportedly not had any recent physical therapy and has not been doing any specific home exercises for pain relief.  Patient has reportedly not been doing any heating or icing application for relief.  Patient has reportedly been taking ibuprofen to help with her pain.       Review of Systems   13-point ROS done and negative except for HPI.     Current Outpatient Medications   Medication Instructions    acetaminophen (Acetaminophen Extra Strength) 500 mg tablet 2 tablet 2 TIMES DAILY (route: oral)    alendronate (FOSAMAX) 70 mg, oral, Every 7 days, Take in the morning with a full glass of water, on an empty stomach, and do not take anything else by mouth or lie down for the next 30 min.    aspirin (Adult Low Dose Aspirin) 81 mg EC tablet 1 tablet DAILY (route: oral)    atorvastatin (LIPITOR) 10 mg, oral, Daily    cholecalciferol (VITAMIN D-3) 50 mcg, Daily    ibuprofen 400 mg, Daily    losartan (COZAAR) 25 mg, oral, Daily     meloxicam (MOBIC) 7.5 mg, oral, Daily, with food    metoprolol succinate XL (TOPROL-XL) 12.5 mg, oral, Daily    omeprazole (PRILOSEC) 40 mg, oral, Daily, LAST REFILL TILL APPT IS MADE    triamterene-hydrochlorothiazid (Maxzide) 75-50 mg tablet 1 tablet, oral, Daily       Past Medical History:   Diagnosis Date    Essential (primary) hypertension 02/07/2017    Benign essential hypertension    Essential (primary) hypertension 10/29/2013    Benign essential hypertension    Pain in unspecified foot 02/06/2018    Heel pain        Past Surgical History:   Procedure Laterality Date    CHOLECYSTECTOMY  04/26/2013    Cholecystectomy    COLONOSCOPY  11/03/2014    Complete Colonoscopy    HYSTERECTOMY  04/26/2013    Hysterectomy    OTHER SURGICAL HISTORY  04/26/2013    Total Abdominal Hysterectomy With Removal Of Ovary(S)    OTHER SURGICAL HISTORY  09/21/2016    Anoscopy (Therapeutic)    TOTAL ABDOMINAL HYSTERECTOMY  10/22/2013    Total Abdominal Hysterectomy        Family History   Problem Relation Name Age of Onset    Rheumatic fever Father      Heart failure Father          Allergies   Allergen Reactions    Ace Inhibitors Unknown     cough        Objective     There were no vitals filed for this visit.     Physical Exam  General: NAD, well groomed, well nourished  Eyes: Non-icteric sclera, EOMI  Ears, Nose, Mouth, and Throat: External ears and nose appear to be without deformity or rash. No lesions or masses noted. Hearing is grossly intact.   Neck: Trachea midline  Respiratory: Nonlabored breathing   Cardiovascular: No noted significant peripheral edema   Skin: No rashes or open lesions/ulcers identified on skin.    Back:   Palpation: No tenderness to palpation over lumbar paraspinous muscles.   Straight leg raise: does not reproduce their pain, bilaterally   YADIRA Maneuver does not reproduce pain bilaterally    Hip: No pain over greater trochanters.    Neurologic:   Cranial nerves grossly intact.   Strength 5/5 and  symmetric plantar/dorsiflexion   Sensation: Normal to light touch throughout  DTRs:normal and symmetric throughout  Kessler: absent    Psychiatric: Alert, orientation to person, place, and time. Cooperative.    Imaging personally reviewed and independently interpreted  Narrative & Impression   Interpreted By:  Daryn Licona,   STUDY:  XR LUMBAR SPINE COMPLETE 4+ VIEWS; XR SACROILIAC JOINTS 3+ VIEWS; ;  12/12/2024 12:32 pm      INDICATION:  Signs/Symptoms:lower back pain on the left, no hx/o injury;  Signs/Symptoms:left SI joint pain, no hx/o injury.      ,M54.50 Low back pain, unspecified      COMPARISON:  None.      ACCESSION NUMBER(S):  [id number]; [id number]      ORDERING CLINICIAN:  BEN SHELL      FINDINGS:  There are 5 lumbar type vertebrae.      The spine curves 35ï¿½ convex to the left centered at L2.      Endplate spurring is present throughout the lumbar region along with  facet arthritic changes.      No acute fracture is noted.      Sacroiliac joints have mild degenerative arthritic changes  bilaterally. No acute fracture or destructive process is noted.      IMPRESSION:  No acute fracture or destructive process of the lumbar spine or  sacroiliac joints is noted.       Assessment/Plan   Shila Venegas is a 84 y.o. female presenting with complaint of lower back pain with radicular symptoms into the left lower extremity.  Patient history and evaluation consistent with left lumbar radiculopathy.    Plan:  -Patient to be given referral for aqua therapy.  We discussed the importance of keeping up with formal physical therapy ongoing as a treatment and maintenance.  -If patient's pain becomes more severe, she is encouraged to reach out to us to schedule a left-sided lumbar transforaminal epidural steroid injection on an as-needed basis.  She had high percentage and lasting relief with her last injection.  We discussed that we could consider repeating this in the future on an as-needed basis.    Medical  Necessity  The patient has failed conservative treatment including different classes of medications and physical therapy.  Patient continues to rate their pain as moderate to severe, affecting quality of sleep, quality of life and  significantly impairing daily activities (ADLs)   We discussed  the risks, benefits and alternatives of the procedure including but not limited to: Lack of efficacy , Transiently worsening pain , Bleeding, Infection , and Nerve Damage and patient is amicable to the plan    Follow up: As needed     The patient was invited to contact us back anytime with any questions or concerns and follow-up with us in the office as needed.     Diagnoses and all orders for this visit:  Spinal stenosis of lumbar region with neurogenic claudication  -     Referral to Physical Therapy; Future      This note was generated with the aid of dictation software, there may be typos despite my attempts at proofreading.

## 2025-01-15 ENCOUNTER — HOSPITAL ENCOUNTER (OUTPATIENT)
Dept: RADIOLOGY | Facility: CLINIC | Age: 85
Discharge: HOME | End: 2025-01-15
Payer: MEDICARE

## 2025-01-15 DIAGNOSIS — Z78.0 MENOPAUSE: ICD-10-CM

## 2025-01-15 PROCEDURE — 77080 DXA BONE DENSITY AXIAL: CPT

## 2025-01-15 PROCEDURE — 77080 DXA BONE DENSITY AXIAL: CPT | Performed by: RADIOLOGY

## 2025-01-19 DIAGNOSIS — M81.0 OSTEOPOROSIS, UNSPECIFIED OSTEOPOROSIS TYPE, UNSPECIFIED PATHOLOGICAL FRACTURE PRESENCE: Primary | ICD-10-CM

## 2025-01-22 ENCOUNTER — EVALUATION (OUTPATIENT)
Dept: PHYSICAL THERAPY | Facility: CLINIC | Age: 85
End: 2025-01-22
Payer: MEDICARE

## 2025-01-22 DIAGNOSIS — M48.062 SPINAL STENOSIS OF LUMBAR REGION WITH NEUROGENIC CLAUDICATION: ICD-10-CM

## 2025-01-22 DIAGNOSIS — M54.16 LUMBAR RADICULOPATHY: Primary | ICD-10-CM

## 2025-01-22 PROCEDURE — 97110 THERAPEUTIC EXERCISES: CPT | Mod: GP

## 2025-01-22 PROCEDURE — 97535 SELF CARE MNGMENT TRAINING: CPT | Mod: GP

## 2025-01-22 PROCEDURE — 97161 PT EVAL LOW COMPLEX 20 MIN: CPT | Mod: GP

## 2025-01-22 ASSESSMENT — ENCOUNTER SYMPTOMS
LOSS OF SENSATION IN FEET: 0
DEPRESSION: 0
OCCASIONAL FEELINGS OF UNSTEADINESS: 1

## 2025-01-22 NOTE — PROGRESS NOTES
Physical Therapy    Physical Therapy Evaluation and Treatment      Patient Name: Shila Venegas  MRN: [id number]  Today's Date: 1/22/2025    Time Calculation  Start Time: 1415  Stop Time: 1456  Time Calculation (min): 41 min    Insurance:  Visit number: 1 out of [place]  Authorization information: none  Insurance Type: 2025 BENEFIT / NO AUTH / NO OOP / MED [employer] / MEDICARE AND [employer] MEDICARE SUPPLEMENT PLAN F / [employer]# [id number]  Certification Period Start Date: 1/22/25  Certification Period End Date: 4/22/25    General:  Reason for visit: spinal stenosis of lumbar region with neurogenic claudication   Referred by: Shawna Whitaker MD     Current Problem:   1. Lumbar radiculopathy  Follow Up In Physical Therapy      2. Spinal stenosis of lumbar region with neurogenic claudication  Referral to Physical Therapy          SUBJECTIVE:  Shila Venegas is a 84 y.o. female referred to PT for spinal stenosis of lumbar region with neurogenic claudication . Patient presents with chief complaint of low back pain with radicular symptoms. Patient reports that she has been dealing with this for at least 3 years now but most recent flare up was in November. Patient reports that she went to urgent care as she has intense pain and numbness/tingling into calf and ankle on L side. Patient reports that since pain has improved but still there a little. Patient followed up with pain management who recommended aquatic therapy. Patient reports that she has pain in L lower back into glute and posterior thigh but denies numbness/tingling. Patient reports that she has done PT in past but does not think it helped much.   Patient reports that she will walk with cane in the morning until she loosens up. Uses cane as needed but not consistently. Patient states that she needs to take pain medication every day still.   Patient reports that pain previously was managed by injections but now pain management would like to try PT before injections.     Onset:  November 2023   Imaging:   XR lumbar spine (12/12/24)  IMPRESSION:  No acute fracture or destructive process of the lumbar spine or  sacroiliac joints is noted.    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     Relevant Past Medical History:Reviewed in chart; closed intertrochanteric fracture of femur, compression fracture of T11 vertebra, diverticulitis, DJD of knee, hearing loss, hyperlipidemia, idiopathic osteoporosis, IBS, lumbar stenosis, primary OA of L hip, pulmonary hypertension, anemia, B LE edema, chronic venous hypertension with inflammation, closed fracture of multiple pubic rami left, closed fracture of multiple ribs of left side, closed wedge compression fracture of T11 vertebrae, degenerative scoliosis, fall, fracture pf pubis closed, closed nondisplaced fracture of greater tuberosity of humerus, lumbar spondylosis, muscle weakness, plantar fascitis, posterior tibial tendon dysfunction, postherpetic neuralgia, proximal humerus fracture, venous insufficiency chronic, atherosclerosis of aorta, stage 3 chronic kidney disease  Surgical History: Reviewed in chart  Medications: Reviewed in chart; acetaminophen, fosamax, atorvastatin, losartan, melxocaim, toprol-XL, omeprazole, maxzide   Allergies: Ace inhibitors     Precautions: PMHx considerations: closed intertrochanteric fracture of femur, compression fracture of T11 vertebra, diverticulitis, DJD of knee, hearing loss, hyperlipidemia, idiopathic osteoporosis, IBS, lumbar stenosis, primary OA of L hip, pulmonary hypertension, anemia, B LE edema, chronic venous hypertension with inflammation, closed fracture of multiple pubic rami left, closed fracture of multiple ribs  "of left side, closed wedge compression fracture of T11 vertebrae, degenerative scoliosis, fall, fracture pf pubis closed, closed nondisplaced fracture of greater tuberosity of humerus, lumbar spondylosis, muscle weakness, plantar fascitis, posterior tibial tendon dysfunction, postherpetic neuralgia, proximal humerus fracture, venous insufficiency chronic, atherosclerosis of aorta, stage 3 chronic kidney disease  Precautions  STEADI Fall Risk Score (The score of 4 or more indicates an increased risk of falling): 4    PT Outcome Measure:   Other Measures  Oswestry Disablity Index (NASIMA): 5/50    Pain:  Lowest:  3/10  Highest:  5/10  Location:  L lower back into buttocks into posterior thigh   Description:  throbbing   Aggravating Factors:  sitting  Relieving Factors:  ibuprofen   Sleep Pattern:  able to sleep without pain   Previous Interventions:  PT in past        Red Flags: Do you have any of the following? Denies numbness/tingling at evaluation, denies loss of control of bowel or bladder   Fever/chills, unexplained weight changes, dizziness/fainting, unexplained change in bowel or bladder functions, unexplained malaise or muscle weakness, night pain/sweats, numbness or tingling    Patient/Family Goal: \"try to stretch more and keep back straighter, walk more\"     OBJECTIVE:    Observation/Posture: forward head and shoulder posture     Gait:  Deviations: patient ambulates with forward flexed posture and decreased gait speed     Range of Motion:   LUMBAR ROM AROM    LEFT RIGHT   Sidebend 75% limited, pain free  75% limited, pain free    Rotation 50% limited, pain  50% limited, pain   Flexion 25% limited, pain free    Extension 75% limited, pain      Strength:  HIP MMT LEFT RIGHT   Flexion 4-/5 4-/5   Adduction  4/5 4/5   Abduction 4-/5 4-/5     KNEE MMT LEFT RIGHT   Flexion 4-/5 4/5   Extension 4-/5 4/5     ANKLE MMT LEFT RIGHT   Dorsiflexion 5/5 5/5     Special Testing:  SPECIAL TEST LEFT RIGHT   Slump Positive  " Negative      Treatments:  Therapeutic Exercise: (10 minutes)  Seated nerve glide x5 ea.   H/L TrA brace x10  H/L glute set x10   H/L figure 4 stretch x20 seconds ea.     Self Care: (8 minutes)  OP Education: Initial evaluation completed, findings and plan of care discussed with patient. Patient education on purpose of interventions in order to improve spinal stability and postural strength. Patient education on benefits of aquatic based interventions in order to improve mobility and strength, then recheck on land and discuss transition to land therapy as appropriate. Patient education on importance of using cane secondary to poor safety and reduce fall risks.  Patient performed and was instructed in an individualized HEP with handout issued as below.      HEP:  Access Code: [email or web address]  URL: [email or web address]  Date: 01/22/2025  Prepared by: Goldie    Exercises  - Seated Slump Nerve Glide  - 1 x daily - 7 x weekly - 2 sets - 10 reps  - Supine Transversus Abdominis Bracing - Hands on Stomach  - 1 x daily - 7 x weekly - 2 sets - 10 reps  - Hooklying Gluteal Sets  - 1 x daily - 7 x weekly - 2 sets - 10 reps  - Supine Figure 4 Piriformis Stretch  - 1 x daily - 7 x weekly - 3-5 sets - 10-20 seconds  hold    ASSESSMENT:  Shila Venegas is a 84 y.o. female referred to PT for spinal stenosis of lumbar region with neurogenic claudication . Patient presents with chief complaint of low back pain with radicular symptoms. Patient demonstrates decreased lumbar AROM in all planes with pain in extension and B rotation, decreased gross LE strength and poor posture. Slump test positive for potential neural/discal origin. Patient would benefit from skilled PT in order to promote pain free functional mobility.     Charges:   Eval Complexity: Low    Therapeutic Exercise: 1 unit   Self Care: 1 unit     Response to treatment: Pt verbalized good understanding of education provided. Pt demonstrated appropriate  performance of HEP with good understanding. Did not exhibit exacerbation of symptoms at end of session.     PLAN:  OP PT PLAN:  Treatment/Interventions: Aquatic Therapy , Education/Instruction , Gait training , Manual Therapy  , Neuromuscular re-education , Self care/home management , Therapeutic activities , and Therapeutic exercise    PT Plan: Skilled PT   PT Frequency: 1-2 times per week  Duration:5 weeks   Certification Period Start Date: 1/22/25  Certification Period End Date: 4/22/25  Visits Approved: 2025 BENEFIT / NO AUTH / NO OOP / MED [facility] / MEDICARE AND [employer] MEDICARE SUPPLEMENT PLAN F / AV[employer]ILITY# [id number]  Rehab Potential: Good  Clinical Presentation: Stable   Plan of Care Agreement: Patient    Goals:    Active       PT Problem       PT Goals- STG       Start:  01/22/25    Expected End:  04/22/25       SHORT TERM GOALS:  1) Pt will report decrease in pain from 5/10 to </= 3/10 to improve quality of life.  2) Pt will maintain centralization of symptoms in order to demonstrate improved spinal stability.  3) Pt will demonstrate painfree lumbar AROM in all planes in order improve mobility.          PT Goals- LTG       Start:  01/22/25    Expected End:  04/22/25       LONG TERM GOALS:  1) Pt will be independent with HEP to promote self management of condition.   2) Pt will improve LE strength to >/= 4+/5 in order to improve LE stability and improved functional mobility.   3) Pt will demonstrate improved gait mechanics without gait deviation and using least restrictive device to promote safe community ambulation.

## 2025-01-27 ENCOUNTER — APPOINTMENT (OUTPATIENT)
Dept: PRIMARY CARE | Facility: CLINIC | Age: 85
End: 2025-01-27
Payer: MEDICARE

## 2025-01-27 VITALS
WEIGHT: 161 LBS | BODY MASS INDEX: 31.44 KG/M2 | HEART RATE: 72 BPM | DIASTOLIC BLOOD PRESSURE: 84 MMHG | SYSTOLIC BLOOD PRESSURE: 136 MMHG

## 2025-01-27 DIAGNOSIS — I07.1 FUNCTIONAL TRICUSPID REGURGITATION: ICD-10-CM

## 2025-01-27 DIAGNOSIS — I27.20 PULMONARY HYPERTENSION (MULTI): ICD-10-CM

## 2025-01-27 DIAGNOSIS — I70.0 ATHEROSCLEROSIS OF AORTA (CMS-HCC): Primary | ICD-10-CM

## 2025-01-27 DIAGNOSIS — M81.8 IDIOPATHIC OSTEOPOROSIS: ICD-10-CM

## 2025-01-27 DIAGNOSIS — E78.2 MIXED HYPERLIPIDEMIA: ICD-10-CM

## 2025-01-27 DIAGNOSIS — R53.81 MALAISE: ICD-10-CM

## 2025-01-27 DIAGNOSIS — I10 BENIGN ESSENTIAL HYPERTENSION: ICD-10-CM

## 2025-01-27 DIAGNOSIS — N18.32 CHRONIC KIDNEY DISEASE, STAGE 3B (MULTI): ICD-10-CM

## 2025-01-27 PROCEDURE — 3075F SYST BP GE 130 - 139MM HG: CPT | Performed by: INTERNAL MEDICINE

## 2025-01-27 PROCEDURE — 1036F TOBACCO NON-USER: CPT | Performed by: INTERNAL MEDICINE

## 2025-01-27 PROCEDURE — 1160F RVW MEDS BY RX/DR IN RCRD: CPT | Performed by: INTERNAL MEDICINE

## 2025-01-27 PROCEDURE — 1126F AMNT PAIN NOTED NONE PRSNT: CPT | Performed by: INTERNAL MEDICINE

## 2025-01-27 PROCEDURE — 1159F MED LIST DOCD IN RCRD: CPT | Performed by: INTERNAL MEDICINE

## 2025-01-27 PROCEDURE — G2211 COMPLEX E/M VISIT ADD ON: HCPCS | Performed by: INTERNAL MEDICINE

## 2025-01-27 PROCEDURE — 1124F ACP DISCUSS-NO DSCNMKR DOCD: CPT | Performed by: INTERNAL MEDICINE

## 2025-01-27 PROCEDURE — 99214 OFFICE O/P EST MOD 30 MIN: CPT | Performed by: INTERNAL MEDICINE

## 2025-01-27 PROCEDURE — 3079F DIAST BP 80-89 MM HG: CPT | Performed by: INTERNAL MEDICINE

## 2025-01-27 ASSESSMENT — ENCOUNTER SYMPTOMS
CHILLS: 0
TREMORS: 0
ARTHRALGIAS: 1
WHEEZING: 0
WOUND: 0
BACK PAIN: 1
ABDOMINAL PAIN: 0
DIARRHEA: 0
ABDOMINAL DISTENTION: 0
TROUBLE SWALLOWING: 0
DIFFICULTY URINATING: 0
EYE REDNESS: 0
DYSURIA: 0
EYE ITCHING: 0
MYALGIAS: 0
CHOKING: 0
PHOTOPHOBIA: 0
PALPITATIONS: 0
SLEEP DISTURBANCE: 0
WEAKNESS: 0
CONSTIPATION: 0
SINUS PRESSURE: 0
COUGH: 0
POLYPHAGIA: 0
SORE THROAT: 0
BRUISES/BLEEDS EASILY: 0
DIAPHORESIS: 0
NECK STIFFNESS: 0
FACIAL SWELLING: 0
HEADACHES: 0
POLYDIPSIA: 0
FACIAL ASYMMETRY: 0
NAUSEA: 0
BLOOD IN STOOL: 0
FLANK PAIN: 0
VOICE CHANGE: 0
HEMATURIA: 0
NECK PAIN: 0
CHEST TIGHTNESS: 0
APPETITE CHANGE: 0
EYE PAIN: 0
LIGHT-HEADEDNESS: 0
NUMBNESS: 0
RHINORRHEA: 0
STRIDOR: 0
ADENOPATHY: 0
RECTAL PAIN: 0
FREQUENCY: 0
ACTIVITY CHANGE: 0
SHORTNESS OF BREATH: 0
FATIGUE: 0
ANAL BLEEDING: 0
EYE DISCHARGE: 0
JOINT SWELLING: 0
VOMITING: 0
COLOR CHANGE: 0
SPEECH DIFFICULTY: 0
DIZZINESS: 0
SINUS PAIN: 0
SEIZURES: 0

## 2025-01-27 ASSESSMENT — PATIENT HEALTH QUESTIONNAIRE - PHQ9
2. FEELING DOWN, DEPRESSED OR HOPELESS: NOT AT ALL
1. LITTLE INTEREST OR PLEASURE IN DOING THINGS: NOT AT ALL
SUM OF ALL RESPONSES TO PHQ9 QUESTIONS 1 AND 2: 0

## 2025-01-27 ASSESSMENT — PAIN SCALES - GENERAL: PAINLEVEL_OUTOF10: 0-NO PAIN

## 2025-01-27 NOTE — PROGRESS NOTES
Subjective   Patient ID: Shila Venegas is a 84 y.o. female who presents for Follow-up.    Patient presents for follow-up.  She has been compliant with her medications, diet and exercise.  She continues with baseline back knee and foot pain.  She reports baseline lower extremity swelling.  She denies any headaches, no dizziness, no chest pain or shortness of breath.  She denies abdominal pain no nausea vomiting or diarrhea.         Review of Systems   Constitutional:  Negative for activity change, appetite change, chills, diaphoresis and fatigue.   HENT:  Positive for hearing loss. Negative for congestion, dental problem, drooling, ear discharge, ear pain, facial swelling, mouth sores, nosebleeds, postnasal drip, rhinorrhea, sinus pressure, sinus pain, sneezing, sore throat, tinnitus, trouble swallowing and voice change.    Eyes:  Negative for photophobia, pain, discharge, redness, itching and visual disturbance.   Respiratory:  Negative for cough, choking, chest tightness, shortness of breath, wheezing and stridor.    Cardiovascular:  Negative for chest pain, palpitations and leg swelling.   Gastrointestinal:  Negative for abdominal distention, abdominal pain, anal bleeding, blood in stool, constipation, diarrhea, nausea, rectal pain and vomiting.   Endocrine: Negative for cold intolerance, heat intolerance, polydipsia, polyphagia and polyuria.   Genitourinary:  Negative for decreased urine volume, difficulty urinating, dysuria, enuresis, flank pain, frequency, genital sores, hematuria and urgency.   Musculoskeletal:  Positive for arthralgias and back pain. Negative for gait problem, joint swelling, myalgias, neck pain and neck stiffness.   Skin:  Negative for color change, pallor, rash and wound.   Neurological:  Negative for dizziness, tremors, seizures, syncope, facial asymmetry, speech difficulty, weakness, light-headedness, numbness and headaches.   Hematological:  Negative for adenopathy. Does not bruise/bleed  easily.   Psychiatric/Behavioral:  Negative for sleep disturbance.        Objective   /84   Pulse 72   Wt 73 kg (161 lb)   BMI 31.44 kg/m²     Physical Exam  Constitutional:       Appearance: Normal appearance.   Cardiovascular:      Rate and Rhythm: Normal rate and regular rhythm.      Heart sounds: No murmur heard.     No gallop.   Pulmonary:      Effort: No respiratory distress.      Breath sounds: No wheezing or rales.   Abdominal:      General: There is no distension.      Palpations: There is no mass.      Tenderness: There is no abdominal tenderness. There is no guarding.   Musculoskeletal:      Right lower leg: Edema (Baseline edema) present.      Left lower leg: No edema (Baseline edema).   Neurological:      Mental Status: She is alert.         Assessment/Plan   Diagnoses and all orders for this visit:  Atherosclerosis of aorta (CMS-HCC)-modify risk factors  Pulmonary hypertension (Multi)-follow-up with cardiology  Chronic kidney disease, stage 3b (Multi)-recheck creatinine  Benign essential hypertension-low-salt diet and exercise  -     Basic Metabolic Panel; Future  Mixed hyperlipidemia-we will continue with statin  Idiopathic bbiwidhqkybz-rchsea-ag with rheumatology  Malaise  -     CBC and Auto Differential; Future  Functional tricuspid regurgitation-stable symptoms.  Repeat echo next year.  Health maintenance-she will get the shingles vaccine.  Mammogram has been done.  Bone density is up-to-date.  Eye, dental and dermatology appointments have been done.  Obesity-she will watch her diet  Back pain-she will follow-up with orthopedics.  Lower extremity swelling-follow-up with vascular

## 2025-01-28 LAB
ANION GAP SERPL CALCULATED.4IONS-SCNC: 10 MMOL/L (CALC) (ref 7–17)
BASOPHILS # BLD AUTO: 38 CELLS/UL (ref 0–200)
BASOPHILS NFR BLD AUTO: 0.6 %
BUN SERPL-MCNC: 30 MG/DL (ref 7–25)
BUN/CREAT SERPL: 32 (CALC) (ref 6–22)
CALCIUM SERPL-MCNC: 9.4 MG/DL (ref 8.6–10.4)
CHLORIDE SERPL-SCNC: 106 MMOL/L (ref 98–110)
CO2 SERPL-SCNC: 26 MMOL/L (ref 20–32)
CREAT SERPL-MCNC: 0.93 MG/DL (ref 0.6–0.95)
EGFRCR SERPLBLD CKD-EPI 2021: 60 ML/MIN/1.73M2
EOSINOPHIL # BLD AUTO: 301 CELLS/UL (ref 15–500)
EOSINOPHIL NFR BLD AUTO: 4.7 %
ERYTHROCYTE [DISTWIDTH] IN BLOOD BY AUTOMATED COUNT: 14 % (ref 11–15)
GLUCOSE SERPL-MCNC: 85 MG/DL (ref 65–99)
HCT VFR BLD AUTO: 38.7 % (ref 35–45)
HGB BLD-MCNC: 12 G/DL (ref 11.7–15.5)
LYMPHOCYTES # BLD AUTO: 595 CELLS/UL (ref 850–3900)
LYMPHOCYTES NFR BLD AUTO: 9.3 %
MCH RBC QN AUTO: 28.3 PG (ref 27–33)
MCHC RBC AUTO-ENTMCNC: 31 G/DL (ref 32–36)
MCV RBC AUTO: 91.3 FL (ref 80–100)
MONOCYTES # BLD AUTO: 339 CELLS/UL (ref 200–950)
MONOCYTES NFR BLD AUTO: 5.3 %
NEUTROPHILS # BLD AUTO: 5126 CELLS/UL (ref 1500–7800)
NEUTROPHILS NFR BLD AUTO: 80.1 %
PLATELET # BLD AUTO: 189 THOUSAND/UL (ref 140–400)
PMV BLD REES-ECKER: 12.3 FL (ref 7.5–12.5)
POTASSIUM SERPL-SCNC: 4.8 MMOL/L (ref 3.5–5.3)
RBC # BLD AUTO: 4.24 MILLION/UL (ref 3.8–5.1)
SODIUM SERPL-SCNC: 142 MMOL/L (ref 135–146)
WBC # BLD AUTO: 6.4 THOUSAND/UL (ref 3.8–10.8)

## 2025-01-29 ENCOUNTER — TREATMENT (OUTPATIENT)
Dept: PHYSICAL THERAPY | Facility: CLINIC | Age: 85
End: 2025-01-29
Payer: MEDICARE

## 2025-01-29 DIAGNOSIS — M54.16 LUMBAR RADICULOPATHY: Primary | ICD-10-CM

## 2025-01-29 PROCEDURE — 97113 AQUATIC THERAPY/EXERCISES: CPT | Mod: GP,CQ

## 2025-01-29 ASSESSMENT — PAIN SCALES - GENERAL: PAINLEVEL_OUTOF10: 3

## 2025-01-29 ASSESSMENT — PAIN - FUNCTIONAL ASSESSMENT: PAIN_FUNCTIONAL_ASSESSMENT: 0-10

## 2025-01-29 NOTE — PROGRESS NOTES
Physical Therapy  Physical Therapy Treatment    Patient Name: Shila Venegas  MRN: [id number]  Today's Date: 1/29/2025  Time Calculation  Start Time: 0745  Stop Time: 0835  Time Calculation (min): 50 min    Insurance:  Visit number: 2 of [place]  Authorization info: No auth  Insurance Type: 2025 BENEFIT / NO AUTH / NO OOP / MED NEC / MEDICARE AND ANTHEM MEDICARE SUPPLEMENT PLAN F / [employer]ILITY# [id number]  Cert date start: 1/22/25  Cert date end: 4/22/25                General   Reason for visit: spinal stenosis of lumbar region with neurogenic claudication    Referred by: Shawna Whitaker MD     Current Problem  1. Lumbar radiculopathy            Precautions  STEADI Fall Risk Score (The score of 4 or more indicates an increased risk of falling): 4    Pain Assessment: 0-10  0-10 (Numeric) Pain Score: 3    Subjective:   Patient reports that she has some pain in her left buttock that is about a 3 today.  HEP Performed:  Yes    Objective:   Patient entered the pool fwb.  Pool temp 92°    Treatments:   Exercises performed in the 4' depth:  Walking fwd/bwd- 4'  Walking high knees- 4'  Walking side strep- 4'  Mini squat- 4'  Toe up/heel up- 4'  SS R/L Quad- 1'x2 ea.  Exercises performed in the 5' depth (with 1 white noodle between the black platforms)  Bicycle- 4'  Hip ab/ad- 4'  Hip ext- 4'  Vertical hang- 4'    Charges: Aquatic 3    Assessment:   Tolerated the session well.  No problems post.    Plan:   Continue decreasing LB radiculopathy increasing rom, flexibility, mobility, balance, strength, endurance and function for return to pain free ADL.    Jose Jacobs, PTA

## 2025-02-05 ENCOUNTER — TREATMENT (OUTPATIENT)
Dept: PHYSICAL THERAPY | Facility: CLINIC | Age: 85
End: 2025-02-05
Payer: MEDICARE

## 2025-02-05 DIAGNOSIS — M54.16 LUMBAR RADICULOPATHY: Primary | ICD-10-CM

## 2025-02-05 PROCEDURE — 97113 AQUATIC THERAPY/EXERCISES: CPT | Mod: GP,CQ

## 2025-02-05 ASSESSMENT — PAIN - FUNCTIONAL ASSESSMENT: PAIN_FUNCTIONAL_ASSESSMENT: 0-10

## 2025-02-05 ASSESSMENT — PAIN SCALES - GENERAL: PAINLEVEL_OUTOF10: 3

## 2025-02-05 NOTE — PROGRESS NOTES
Physical Therapy  Physical Therapy Treatment    Patient Name: Shila Venegas  MRN: [id number]  Today's Date: 2/5/2025  Time Calculation  Start Time: 0920  Stop Time: 1000  Time Calculation (min): 40 min    Insurance:  Visit number:3 of [place]  Authorization info: No auth  Insurance Type: 2025 BENEFIT / NO AUTH / NO OOP / MED NEC / MEDICARE AND ANTHEM MEDICARE SUPPLEMENT PLAN F / AVAILITY# [id number]  Cert date start: 1/22/25  Cert date end: 4/22/25                General   Reason for visit: spinal stenosis of lumbar region with neurogenic claudication    Referred by: Shawna Whitaker MD     Current Problem  1. Lumbar radiculopathy            Precautions  STEADI Fall Risk Score (The score of 4 or more indicates an increased risk of falling): 4    Pain Assessment: 0-10  0-10 (Numeric) Pain Score: 3    Subjective:   Patient reports that her pain in her left buttock is still a 3.  HEP Performed:  Yes    Objective:   Patient entered the pool fwb.  Pool temp 92°    Treatments:   Exercises performed in the 4' depth:  Walking fwd/bwd- 4'  Walking high knees- 4'  Walking side strep- 4'  Mini squat- 4'  Toe up/heel up- 4'  SS R/L Quad- 1'x2 ea.  Exercises performed in the 5' depth (with 1 white noodle between the black platforms)  Bicycle- 4'  Hip ab/ad- 4'  Hip ext- 4'  Vertical hang- 4'    Charges: Aquatic 3    Assessment:   Tolerated the session well.  No problems post.    Plan:   Continue decreasing LB radiculopathy increasing rom, flexibility, mobility, balance, strength, endurance and function for return to pain free ADL.    Jose Jacobs, PTA

## 2025-02-11 ENCOUNTER — APPOINTMENT (OUTPATIENT)
Dept: PHYSICAL THERAPY | Facility: CLINIC | Age: 85
End: 2025-02-11
Payer: MEDICARE

## 2025-02-13 ENCOUNTER — APPOINTMENT (OUTPATIENT)
Dept: PHYSICAL THERAPY | Facility: CLINIC | Age: 85
End: 2025-02-13
Payer: MEDICARE

## 2025-02-13 DIAGNOSIS — M54.16 LUMBAR RADICULOPATHY: Primary | ICD-10-CM

## 2025-02-18 ENCOUNTER — TREATMENT (OUTPATIENT)
Dept: PHYSICAL THERAPY | Facility: CLINIC | Age: 85
End: 2025-02-18
Payer: MEDICARE

## 2025-02-18 DIAGNOSIS — M54.16 LUMBAR RADICULOPATHY: Primary | ICD-10-CM

## 2025-02-18 PROCEDURE — 97113 AQUATIC THERAPY/EXERCISES: CPT | Mod: GP | Performed by: PHYSICAL THERAPIST

## 2025-02-18 NOTE — PROGRESS NOTES
Physical Therapy  Physical Therapy Treatment    Patient Name: Shila Venegas  MRN: [id number]  Today's Date: 2/18/2025  Time Calculation  Start Time: 0824  Stop Time: 0900  Time Calculation (min): 36 min    Insurance:  Visit number: 4 of [place]  Authorization info: No auth  Insurance Type: 2025 BENEFIT / NO AUTH / NO OOP / MED NEC / MEDICARE AND ANTHEM MEDICARE SUPPLEMENT PLAN F / AVAILITY# [id number]  Cert date start: 1/22/25  Cert date end: 4/22/25                General   Reason for visit: spinal stenosis of lumbar region with neurogenic claudication    Referred by: Shawna Whiatker MD     Current Problem  1. Lumbar radiculopathy                      Subjective:   Patient reports no pain upon arrival today. She does occasionally get pain in her buttock.  HEP Performed:  Yes    Objective:   Patient entered the pool fwb.  Pool temp 92°    Treatments:   Exercises performed in the 4' depth:  - Walking fwd/bwd- 4'  - Walking high knees- 4'  - Walking side strep- 4'  3ft:  - Mini squat- 4'  - Toe up/heel up- 4'  Exercises performed in the 5' depth (with 1 white noodle between the black platforms)  - Bicycle- 4'  - Hip ab/ad- 4'  - Hip ext- 4' (NP)  - Vertical hang- 4'    Charges: Aquatic - 2    Assessment:   Tolerated the session well with no increase in pain reported throughout session. She was able to complete all exercises with minimal cueing for proper body mechanics and form, and no increase in symptoms. Patient left clinic with pain at 0/10.     Plan:   Continue decreasing LB radiculopathy increasing rom, flexibility, mobility, balance, strength, endurance and function for return to pain free ADL.    Taylor Baker, PT

## 2025-02-19 ENCOUNTER — LAB (OUTPATIENT)
Dept: LAB | Facility: HOSPITAL | Age: 85
End: 2025-02-19
Payer: MEDICARE

## 2025-02-19 ENCOUNTER — HOSPITAL ENCOUNTER (OUTPATIENT)
Dept: RADIOLOGY | Facility: CLINIC | Age: 85
Discharge: HOME | End: 2025-02-19
Payer: MEDICARE

## 2025-02-19 ENCOUNTER — APPOINTMENT (OUTPATIENT)
Dept: RHEUMATOLOGY | Facility: CLINIC | Age: 85
End: 2025-02-19
Payer: MEDICARE

## 2025-02-19 VITALS
OXYGEN SATURATION: 92 % | HEART RATE: 93 BPM | BODY MASS INDEX: 32.22 KG/M2 | SYSTOLIC BLOOD PRESSURE: 137 MMHG | DIASTOLIC BLOOD PRESSURE: 81 MMHG | TEMPERATURE: 98.3 F | WEIGHT: 165 LBS

## 2025-02-19 DIAGNOSIS — M81.0 OSTEOPOROSIS, UNSPECIFIED OSTEOPOROSIS TYPE, UNSPECIFIED PATHOLOGICAL FRACTURE PRESENCE: ICD-10-CM

## 2025-02-19 DIAGNOSIS — M79.671 RIGHT FOOT PAIN: ICD-10-CM

## 2025-02-19 DIAGNOSIS — M79.671 RIGHT FOOT PAIN: Primary | ICD-10-CM

## 2025-02-19 LAB — PROT SERPL-MCNC: 6.6 G/DL (ref 6.4–8.2)

## 2025-02-19 PROCEDURE — 84165 PROTEIN E-PHORESIS SERUM: CPT | Performed by: STUDENT IN AN ORGANIZED HEALTH CARE EDUCATION/TRAINING PROGRAM

## 2025-02-19 PROCEDURE — 1126F AMNT PAIN NOTED NONE PRSNT: CPT | Performed by: STUDENT IN AN ORGANIZED HEALTH CARE EDUCATION/TRAINING PROGRAM

## 2025-02-19 PROCEDURE — 84155 ASSAY OF PROTEIN SERUM: CPT

## 2025-02-19 PROCEDURE — 3075F SYST BP GE 130 - 139MM HG: CPT | Performed by: STUDENT IN AN ORGANIZED HEALTH CARE EDUCATION/TRAINING PROGRAM

## 2025-02-19 PROCEDURE — 73620 X-RAY EXAM OF FOOT: CPT | Mod: RT

## 2025-02-19 PROCEDURE — 3079F DIAST BP 80-89 MM HG: CPT | Performed by: STUDENT IN AN ORGANIZED HEALTH CARE EDUCATION/TRAINING PROGRAM

## 2025-02-19 PROCEDURE — 1159F MED LIST DOCD IN RCRD: CPT | Performed by: STUDENT IN AN ORGANIZED HEALTH CARE EDUCATION/TRAINING PROGRAM

## 2025-02-19 PROCEDURE — 99204 OFFICE O/P NEW MOD 45 MIN: CPT | Performed by: STUDENT IN AN ORGANIZED HEALTH CARE EDUCATION/TRAINING PROGRAM

## 2025-02-19 PROCEDURE — 86334 IMMUNOFIX E-PHORESIS SERUM: CPT | Performed by: STUDENT IN AN ORGANIZED HEALTH CARE EDUCATION/TRAINING PROGRAM

## 2025-02-19 RX ORDER — PSYLLIUM HUSK 0.4 G
1 CAPSULE ORAL DAILY
Qty: 90 TABLET | Refills: 1 | Status: SHIPPED | OUTPATIENT
Start: 2025-02-19

## 2025-02-19 ASSESSMENT — PAIN SCALES - GENERAL: PAINLEVEL_OUTOF10: 0-NO PAIN

## 2025-02-19 NOTE — PROGRESS NOTES
Subjective   Patient ID: Shila Venegas is a 84 y.o. female who presents for New Patient Visit.  HPI  The patient is an 84 year old female here for evaluation and management of osteoporosis.     PMH of severe OA.     - Currently has been on Alendronate for a year now.  Notes that she does have a history of prior fractures after falls.  Notes compression fractures in her back (T11-12). Otherwise has chronic pain in her knees and feet, mechanical in nature.   Otherwise notes some acute pain and swelling in her right big toe that has been going on for 2 days. No other joint swelling or stiffness currently. No rashes, vision issues. No planned dental work, has dentures. No chest pain, shortness of breath, cough, GI/  issues.    Social history:  - Used to run a dance studio, does not smoke or drink alcohol.     History of falls / fractures: yes  Loss of height: yes  Tobacco: no  Alcohol: no  Vitamin D supplementation: yes  Calcium supplementation: yes  Weight bearing exercise: yes  Previous or planned invasive jaw surgery: no  History of radiation: no  Chronic steroid use: no  History of RA: no  GERD: no  Kinza-en-y: no  CKD / GFR <30: yes  Parental hip fracture: no      DEXA 1/15/25:  FINDINGS:  SPINE L1-L4  Bone Mineral Density: 1.099  T-Score 0.6  Z-Score 3.4  Classification:  Normal  Bone Mineral Density change vs baseline:  19.4  Bone Mineral Density change vs previous: 2.5      RIGHT FEMUR -TOTAL  Bone Mineral Density: 0.762  T-Score -1.5   Z-Score 0.9  Classification:  Osteopenia  Bone Mineral Density change vs baseline:  -2.1  Bone Mineral Density change vs previous: 4.3      RIGHT FEMUR -NECK  Bone Mineral Density: 0.561  T-Score -2.6   Z-Score  -0.1    Review of Systems  Constitutional: Denies fever, chills   Eyes: Denies dry eyes, blurry vision, redness of the eyes, pain in the eyes   ENT: Denies dry mouth, loss of taste, sores in the mouth, or difficulty swallowing   Cardiovascular: Denies chest pain,  palpitations   Respiratory: Denies shortness of breath   Gastrointestinal: Denies changes in bowl or bladder function, nausea, vomiting, heartburn   Integumentary: Denies photosensitivity, rash or lesions, Raynaud's   Neurological: Denies any numbness or tingling   Hematologic/Lymphatic: Denies bleeding, alopecia, Raynaud's phenomena   MSK: As per HPI. Denies weakness, difficulty rising from chair, aches, problems with hand      Objective   Physical Exam  General: AAOx3. Cooperative.   Head: normocephalic, atraumatic   Eyes: EOMI, conjunctiva clear, sclera white, anicteric   Ears: hearing intact   Nose: no deformity   Throat/Mouth: No oral deformities. Mucosa appear moist. No oral ulcers noted.   Neck/Lymph: FROM. trachea midline   Lungs: chest expansion symmetric Clear to auscultation bilaterally. No wheezing, rhonchi, stridor.   Heart: S1, S2, RRR. No murmur, rub.   Abdomen: Soft, non-tender without masses   Neuro: CN II-XII grossly intact, no focal deficit   Skin: No rashes, ulcers or photosensitive areas   MSK: Upper Extremities:   Hand/Fingers: No redness, swelling, pain at DIP, PIP, or MCP joints, FROM grossly.   Wrists: no erythema, swelling, or pain at wrist, FROM grossly   Elbows: No swelling, pain, erythema on elbows, FROM grossly   Shoulders: no swelling, redness, tenderness at shoulders   Lower Extremities:   Hips: No obvious deformities. no joint tenderness, normal ROM grossly   Knees: No pain, deformities, swelling, rashes, warmth, normal ROM grossly   Ankles, feet: right 1st MTP with redness and swelling    Assessment/Plan   Diagnoses and all orders for this visit:  Right foot pain  -     XR foot right 1-2 views; Future  -     Uric Acid; Future  -     Sedimentation Rate; Future  -     C-Reactive Protein; Future  Osteoporosis, unspecified osteoporosis type, unspecified pathological fracture presence  -     Referral to Rheumatology  -     Parathyroid Hormone, Intact; Future  -     Serum Protein  Electrophoresis; Future  -     calcium carbonate-vitamin D3 500 mg-5 mcg (200 unit) tablet; Take 1 tablet by mouth once daily.       The patient is an 84 year old female with a PMH of severe OA here for evaluation and management of osteoporosis.      - Recent DEXA w/ lowest T score -2.6: documented below:  DEXA 1/15/25:  FINDINGS:  SPINE L1-L4  Bone Mineral Density: 1.099  T-Score 0.6  Z-Score 3.4  Classification:  Normal  Bone Mineral Density change vs baseline:  19.4  Bone Mineral Density change vs previous: 2.5      RIGHT FEMUR -TOTAL  Bone Mineral Density: 0.762  T-Score -1.5   Z-Score 0.9  Classification:  Osteopenia  Bone Mineral Density change vs baseline:  -2.1  Bone Mineral Density change vs previous: 4.3      RIGHT FEMUR -NECK  Bone Mineral Density: 0.561  T-Score -2.6   Z-Score  -0.1    - Labs stable    - Has been on Alendronate for 1 year now, taking Ca.    PLAN:  - Continue Alendronate for now, weekly  - Continue Ca, vit D daily  - Repeat DEXA in 2027  - Regarding right 1st MTP pain:  most likely OA, but since she does have swelling: will evaluate for gout: obtain ESR, CRP, Uric acid and right foot X-ray.   - Follow up in 6 months    Case discussed with Dr. Maribel Oreilly DO 02/19/25 8:45 AM

## 2025-02-19 NOTE — PROGRESS NOTES
I saw and evaluated the patient. I personally obtained the key and critical portions of the history and physical exam or was physically present for key and critical portions performed by the trainee. I reviewed the trainee's documentation and discussed the patient with the trainee. I agree with the trainee's medical decision making, as documented on the trainee's note.     Cong López MD

## 2025-02-20 LAB
CRP SERPL-MCNC: 5.9 MG/L
ERYTHROCYTE [SEDIMENTATION RATE] IN BLOOD BY WESTERGREN METHOD: 28 MM/H
PTH-INTACT SERPL-MCNC: 53 PG/ML (ref 16–77)
URATE SERPL-MCNC: 8.7 MG/DL (ref 2.5–7)

## 2025-02-21 ENCOUNTER — TELEPHONE (OUTPATIENT)
Dept: RHEUMATOLOGY | Facility: CLINIC | Age: 85
End: 2025-02-21
Payer: MEDICARE

## 2025-02-21 DIAGNOSIS — M10.9 ACUTE GOUT INVOLVING TOE OF RIGHT FOOT, UNSPECIFIED CAUSE: Primary | ICD-10-CM

## 2025-02-21 LAB
ALBUMIN: 3.7 G/DL (ref 3.4–5)
ALPHA 1 GLOBULIN: 0.4 G/DL (ref 0.2–0.6)
ALPHA 2 GLOBULIN: 0.8 G/DL (ref 0.4–1.1)
BETA GLOBULIN: 0.8 G/DL (ref 0.5–1.2)
GAMMA GLOBULIN: 0.9 G/DL (ref 0.5–1.4)
IMMUNOFIXATION COMMENT: NORMAL
PATH REVIEW - SERUM IMMUNOFIXATION: NORMAL
PATH REVIEW-SERUM PROTEIN ELECTROPHORESIS: NORMAL
PROTEIN ELECTROPHORESIS COMMENT: NORMAL

## 2025-02-21 RX ORDER — PREDNISONE 20 MG/1
40 TABLET ORAL DAILY
Qty: 10 TABLET | Refills: 0 | Status: SHIPPED | OUTPATIENT
Start: 2025-02-21 | End: 2025-02-26

## 2025-03-05 ENCOUNTER — APPOINTMENT (OUTPATIENT)
Dept: PHYSICAL THERAPY | Facility: CLINIC | Age: 85
End: 2025-03-05
Payer: MEDICARE

## 2025-03-05 DIAGNOSIS — M54.16 LUMBAR RADICULOPATHY: Primary | ICD-10-CM

## 2025-03-29 DIAGNOSIS — M81.8 IDIOPATHIC OSTEOPOROSIS: ICD-10-CM

## 2025-03-31 RX ORDER — ALENDRONATE SODIUM 70 MG/1
TABLET ORAL
Qty: 12 TABLET | Refills: 3 | Status: SHIPPED | OUTPATIENT
Start: 2025-03-31

## 2025-04-03 ENCOUNTER — TREATMENT (OUTPATIENT)
Dept: PHYSICAL THERAPY | Facility: CLINIC | Age: 85
End: 2025-04-03
Payer: MEDICARE

## 2025-04-03 DIAGNOSIS — M54.16 LUMBAR RADICULOPATHY: Primary | ICD-10-CM

## 2025-04-03 PROCEDURE — 97113 AQUATIC THERAPY/EXERCISES: CPT | Mod: GP | Performed by: PHYSICAL THERAPIST

## 2025-04-03 NOTE — PROGRESS NOTES
Physical Therapy  Physical Therapy Treatment    Patient Name: Shila Venegas  MRN: [id number]  Today's Date: 4/3/2025  Time Calculation  Start Time: 0825  Stop Time: 0857  Time Calculation (min): 32 min    Insurance:  Visit number: 4 of [place]  Authorization info: No auth  Insurance Type: 2025 BENEFIT / NO AUTH / NO OOP / MED NEC / MEDICARE AND ANTHEM MEDICARE SUPPLEMENT PLAN F / AVAILITY# [id number]  Cert date start: 1/22/25  Cert date end: 4/22/25                General   Reason for visit: spinal stenosis of lumbar region with neurogenic claudication    Referred by: Shawna Whitaker MD     Current Problem  1. Lumbar radiculopathy  Follow Up In Physical Therapy                Subjective:   Patient reports no pain upon arrival today. She was in [place] for 3 weeks and utilized the pool there. Between the warm weather and the pool, she feels that this improved her back pain.     HEP Performed:  Yes    Objective:   Patient entered the pool fwb.  Pool temp 92°    Treatments:   4ft:  - Walking fwd/bwd- 5'  - Walking high knees- 5'  - Walking side step- 5'  - hip abd/add x 5'  - hip ext x 5'    3ft:  - Mini squat- 4'    Charges: Aquatic - 2    Assessment:   Patient able to complete all exercises without an increase in pain. Occasional verbal cueing was required to avoid forward trunk lean with UE support of bar. Encouraged patient to look into a community pool where she can carry over her aquatic exercises independently, and transition to gym based exercises/treatment in near future.       Plan:   Continue decreasing LB radiculopathy increasing rom, flexibility, mobility, balance, strength, endurance and function for return to pain free ADL.    Taylor Baker, PT

## 2025-04-09 NOTE — PROGRESS NOTES
Physical Therapy  Physical Therapy Treatment    Patient Name: Shila Venegas  MRN: [id number]  Today's Date: 4/10/2025  Time Calculation  Start Time: 0821  Stop Time: 0854  Time Calculation (min): 33 min    Insurance:  Visit number: 6 of [place]  Authorization info: No auth  Insurance Type: 2025 BENEFIT / NO AUTH / NO OOP / MED NEC / MEDICARE AND ANTHEM MEDICARE SUPPLEMENT PLAN F / AVAILITY# [id number]  Cert date start: 1/22/25  Cert date end: 4/22/25                General   Reason for visit: spinal stenosis of lumbar region with neurogenic claudication    Referred by: Shawna Whitaker MD     Current Problem  1. Lumbar radiculopathy  Follow Up In Physical Therapy          Relevant Past Medical History:Reviewed in chart; closed intertrochanteric fracture of femur, compression fracture of T11 vertebra, diverticulitis, DJD of knee, hearing loss, hyperlipidemia, idiopathic osteoporosis, IBS, lumbar stenosis, primary OA of L hip, pulmonary hypertension, anemia, B LE edema, chronic venous hypertension with inflammation, closed fracture of multiple pubic rami left, closed fracture of multiple ribs of left side, closed wedge compression fracture of T11 vertebrae, degenerative scoliosis, fall, fracture pf pubis closed, closed nondisplaced fracture of greater tuberosity of humerus, lumbar spondylosis, muscle weakness, plantar fascitis, posterior tibial tendon dysfunction, postherpetic neuralgia, proximal humerus fracture, venous insufficiency chronic, atherosclerosis of aorta, stage 3 chronic kidney disease           Subjective:   Patient reports her back is feeling better, but her knees continue to bother her. She reports using the pool in [place] every day while she was there a few weeks ago, and felt that this helped alleviate quite a bit of her symptoms.     HEP Performed:  Yes    Objective:   Patient entered the pool fwb.  Pool temp 92°    Treatments:   4ft:  - Walking fwd/bwd- 5'  - Walking high knees- 5'  - Walking side  step- 5'  - hip abd/add x 5'  - hip ext x 5'    3ft:  - Mini squat- 2'  - heel raises x 2'    Charges: Aquatic - 2    Assessment:   The focus of today's session was on LE and core strengthening. The pt demonstrated good tolerance to the noted exercises today. The patient demonstrates good progress towards their goals at this time as demonstrated by an improvement in back pain and overall function.  No pain was reported during the treatment session today. Plan to perform recheck next visit to transition to gym based treatment.       Plan:   Recheck for transition to gym based exercises next visit.    Taylor Baker, PT

## 2025-04-10 ENCOUNTER — TREATMENT (OUTPATIENT)
Dept: PHYSICAL THERAPY | Facility: CLINIC | Age: 85
End: 2025-04-10
Payer: MEDICARE

## 2025-04-10 DIAGNOSIS — M54.16 LUMBAR RADICULOPATHY: Primary | ICD-10-CM

## 2025-04-10 PROCEDURE — 97113 AQUATIC THERAPY/EXERCISES: CPT | Mod: GP | Performed by: PHYSICAL THERAPIST

## 2025-04-17 ENCOUNTER — DOCUMENTATION (OUTPATIENT)
Dept: PHYSICAL THERAPY | Facility: CLINIC | Age: 85
End: 2025-04-17
Payer: MEDICARE

## 2025-04-17 DIAGNOSIS — M54.16 LUMBAR RADICULOPATHY: Primary | ICD-10-CM

## 2025-04-17 NOTE — PROGRESS NOTES
Physical Therapy  Patient No-Show Documentation       Shila angulo showed for their visit on 4/17/2025. This was her last scheduled visit.       Taylor Baker, PT

## 2025-04-30 ENCOUNTER — OFFICE VISIT (OUTPATIENT)
Dept: CARDIOLOGY | Facility: CLINIC | Age: 85
End: 2025-04-30
Payer: MEDICARE

## 2025-04-30 VITALS
SYSTOLIC BLOOD PRESSURE: 129 MMHG | HEIGHT: 60 IN | HEART RATE: 93 BPM | WEIGHT: 165 LBS | RESPIRATION RATE: 18 BRPM | DIASTOLIC BLOOD PRESSURE: 81 MMHG | BODY MASS INDEX: 32.39 KG/M2

## 2025-04-30 DIAGNOSIS — I87.2 VENOUS INSUFFICIENCY (CHRONIC) (PERIPHERAL): Primary | ICD-10-CM

## 2025-04-30 PROCEDURE — 3079F DIAST BP 80-89 MM HG: CPT | Performed by: INTERNAL MEDICINE

## 2025-04-30 PROCEDURE — 1125F AMNT PAIN NOTED PAIN PRSNT: CPT | Performed by: INTERNAL MEDICINE

## 2025-04-30 PROCEDURE — G2211 COMPLEX E/M VISIT ADD ON: HCPCS | Performed by: INTERNAL MEDICINE

## 2025-04-30 PROCEDURE — 1159F MED LIST DOCD IN RCRD: CPT | Performed by: INTERNAL MEDICINE

## 2025-04-30 PROCEDURE — 3074F SYST BP LT 130 MM HG: CPT | Performed by: INTERNAL MEDICINE

## 2025-04-30 PROCEDURE — 99213 OFFICE O/P EST LOW 20 MIN: CPT | Performed by: INTERNAL MEDICINE

## 2025-04-30 PROCEDURE — 1160F RVW MEDS BY RX/DR IN RCRD: CPT | Performed by: INTERNAL MEDICINE

## 2025-04-30 PROCEDURE — 1036F TOBACCO NON-USER: CPT | Performed by: INTERNAL MEDICINE

## 2025-04-30 PROCEDURE — 99214 OFFICE O/P EST MOD 30 MIN: CPT | Performed by: INTERNAL MEDICINE

## 2025-04-30 ASSESSMENT — PAIN SCALES - GENERAL: PAINLEVEL_OUTOF10: 2

## 2025-04-30 NOTE — PROGRESS NOTES
OUTPATIENT FOLLOW-UP -  VASCULAR MEDICINE    DOS: 4/29/25  Last seen:    9/18/24    REQUESTING PHYSICIAN:  Dr. Ariel Martínez     REASON FOR FOLLOW-UP:  here for follow up CVI    HISTORY OF PRESENT ILLNESS:     83 yo lady here for follow up CVI. Using the compression. Using 20-30 mmHg Juzo.     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     Reports toes are bothering her. States saw rheum and rx steroids for presumed gout. Reports the toes feel numb. This has been for a while.       From prev note:  Has prev had ablation with Dr. Vera 2018. Suspect the intermittent swelling (worse in the summer etc) is likely mild CVI. Recent ECHO moderate TR and elevated RVSP and this may be a future target. Saw Dr. Jimenez - recs: Echo 10/23 with mod AVS and mod TR. Can consider echo every 2-3 years (earlier for new or worsened symptoms).     REVIEW OF SYSTEMS:     weight is stable  No sores, ulcers, rashes, + skin lesions  + edema, no calf pain    PHYSICAL EXAMINATION:   /81 (BP Location: Left arm, Patient Position: Sitting)   Pulse 93   Resp 18   Ht 1.524 m (5')   Wt 74.8 kg (165 lb)   BMI 32.22 kg/m²     Gen: Appears well, NAD  Ext: 1+ edema, nontender swelling mostly at the ankles  Skin: multiple chronic skin lesions  +LDS and hemosiderin over the left gaiter region  Pulses: DP 2+;   Mood and affect appropriate    ADDITIONAL DATA:   20-30mmHg compression worn   right calf: 40.0cm   left calf:  40.5cm     ASSESSMENT/PLAN:    here for follow up CVI - continue the skin care and compression. If the Bx site fail to heal please call for a sooner appt. Elevate at night. Follow up Sept.

## 2025-04-30 NOTE — PATIENT INSTRUCTIONS
ASSESSMENT/PLAN:    here for follow up CVI - continue the skin care and compression. If the Bx site fail to heal please call for a sooner appt. Elevate at night. Follow up Sept.

## 2025-05-01 ENCOUNTER — APPOINTMENT (OUTPATIENT)
Dept: OPHTHALMOLOGY | Facility: CLINIC | Age: 85
End: 2025-05-01
Payer: MEDICARE

## 2025-05-01 DIAGNOSIS — H02.88A MEIBOMIAN GLAND DYSFUNCTION (MGD) OF UPPER AND LOWER EYELID OF RIGHT EYE: ICD-10-CM

## 2025-05-01 DIAGNOSIS — H02.88B MEIBOMIAN GLAND DYSFUNCTION (MGD) OF UPPER AND LOWER EYELID OF LEFT EYE: ICD-10-CM

## 2025-05-01 DIAGNOSIS — H16.223 KERATOCONJUNCTIVITIS SICCA OF BOTH EYES NOT DUE TO SJOGREN'S SYNDROME: ICD-10-CM

## 2025-05-01 DIAGNOSIS — H25.813 COMBINED FORM OF SENILE CATARACT OF BOTH EYES: Primary | ICD-10-CM

## 2025-05-01 PROCEDURE — 99213 OFFICE O/P EST LOW 20 MIN: CPT | Performed by: OPTOMETRIST

## 2025-05-01 ASSESSMENT — ENCOUNTER SYMPTOMS
ENDOCRINE NEGATIVE: 0
CONSTITUTIONAL NEGATIVE: 0
GASTROINTESTINAL NEGATIVE: 0
HEMATOLOGIC/LYMPHATIC NEGATIVE: 0
CARDIOVASCULAR NEGATIVE: 0
MUSCULOSKELETAL NEGATIVE: 0
PSYCHIATRIC NEGATIVE: 0
EYES NEGATIVE: 0
NEUROLOGICAL NEGATIVE: 0
RESPIRATORY NEGATIVE: 0
ALLERGIC/IMMUNOLOGIC NEGATIVE: 0

## 2025-05-01 ASSESSMENT — EXTERNAL EXAM - RIGHT EYE: OD_EXAM: NORMAL

## 2025-05-01 ASSESSMENT — VISUAL ACUITY
OD_BAT_HIGH: 20/60-2
METHOD: SNELLEN - LINEAR
OD_CC+: -2+1
OS_BAT_HIGH: 20/40+2
OS_CC: 20/40
CORRECTION_TYPE: GLASSES
OS_CC+: +1
OD_CC: 20/30

## 2025-05-01 ASSESSMENT — REFRACTION_MANIFEST
OS_AXIS: 019
OS_CYLINDER: +1.25
OS_ADD: +3.00
OD_SPHERE: -1.50
OD_ADD: +3.00
OS_SPHERE: -0.75
OD_AXIS: 038
OD_CYLINDER: +0.50

## 2025-05-01 ASSESSMENT — REFRACTION_WEARINGRX
OD_AXIS: 038
OS_SPHERE: -0.75
OS_CYLINDER: +1.25
OD_SPHERE: -2.00
OS_AXIS: 019
OD_CYLINDER: +0.50
OS_ADD: +3.00
OD_ADD: +3.00

## 2025-05-01 ASSESSMENT — EXTERNAL EXAM - LEFT EYE: OS_EXAM: NORMAL

## 2025-05-01 NOTE — PROGRESS NOTES
Assessment/Plan   Diagnoses and all orders for this visit:  Combined form of senile cataract of both eyes  Patient with very mild symptoms of cataract w/ glasses on. BCVA is 20/25+ OU w/ updated refraction. Defer cataract sx eval for now. Will consider with worsening subjective or objective findings.   RTC 6 months for comprehensive exam w/ glare testing, refraction and DFE.    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 Add lid hygiene a few times per week w/ baby shampoo.   Continue w/ ATs bid-tid OU.

## 2025-05-07 ENCOUNTER — OFFICE VISIT (OUTPATIENT)
Dept: ORTHOPEDIC SURGERY | Facility: HOSPITAL | Age: 85
End: 2025-05-07
Payer: MEDICARE

## 2025-05-07 ENCOUNTER — HOSPITAL ENCOUNTER (OUTPATIENT)
Dept: RADIOLOGY | Facility: HOSPITAL | Age: 85
Discharge: HOME | End: 2025-05-07
Payer: MEDICARE

## 2025-05-07 DIAGNOSIS — M17.11 PRIMARY OSTEOARTHRITIS OF RIGHT KNEE: ICD-10-CM

## 2025-05-07 DIAGNOSIS — S42.254D CLOSED NONDISPLACED FRACTURE OF GREATER TUBEROSITY OF RIGHT HUMERUS WITH ROUTINE HEALING, SUBSEQUENT ENCOUNTER: Primary | ICD-10-CM

## 2025-05-07 DIAGNOSIS — S42.254D CLOSED NONDISPLACED FRACTURE OF GREATER TUBEROSITY OF RIGHT HUMERUS WITH ROUTINE HEALING, SUBSEQUENT ENCOUNTER: ICD-10-CM

## 2025-05-07 PROCEDURE — 99213 OFFICE O/P EST LOW 20 MIN: CPT | Performed by: PHYSICIAN ASSISTANT

## 2025-05-07 PROCEDURE — 1159F MED LIST DOCD IN RCRD: CPT | Performed by: PHYSICIAN ASSISTANT

## 2025-05-07 PROCEDURE — 20610 DRAIN/INJ JOINT/BURSA W/O US: CPT | Mod: RT | Performed by: PHYSICIAN ASSISTANT

## 2025-05-07 PROCEDURE — 73030 X-RAY EXAM OF SHOULDER: CPT | Mod: RT

## 2025-05-07 PROCEDURE — 2500000004 HC RX 250 GENERAL PHARMACY W/ HCPCS (ALT 636 FOR OP/ED): Performed by: PHYSICIAN ASSISTANT

## 2025-05-07 PROCEDURE — 73030 X-RAY EXAM OF SHOULDER: CPT | Mod: RIGHT SIDE | Performed by: RADIOLOGY

## 2025-05-07 PROCEDURE — 99213 OFFICE O/P EST LOW 20 MIN: CPT | Mod: 25 | Performed by: PHYSICIAN ASSISTANT

## 2025-05-07 RX ORDER — TRIAMCINOLONE ACETONIDE 40 MG/ML
40 INJECTION, SUSPENSION INTRA-ARTICULAR; INTRAMUSCULAR
Status: COMPLETED | OUTPATIENT
Start: 2025-05-07 | End: 2025-05-07

## 2025-05-07 RX ORDER — LIDOCAINE HYDROCHLORIDE 10 MG/ML
4 INJECTION, SOLUTION INFILTRATION; PERINEURAL
Status: COMPLETED | OUTPATIENT
Start: 2025-05-07 | End: 2025-05-07

## 2025-05-07 RX ADMIN — LIDOCAINE HYDROCHLORIDE 4 ML: 10 INJECTION, SOLUTION INFILTRATION; PERINEURAL at 11:24

## 2025-05-07 RX ADMIN — TRIAMCINOLONE ACETONIDE 40 MG: 400 INJECTION, SUSPENSION INTRA-ARTICULAR; INTRAMUSCULAR at 11:24

## 2025-05-07 NOTE — PROGRESS NOTES
Shila returns to clinic today for recurrent pain of her right knee. She has a history of right knee osteoarthritis. History of left knee arthroplasty. She is requesting an injection today for her right knee.  She also complains of some right shoulder pain and limited range of motion.  History of right proximal humerus fracture that was treated by me back in 2022 nonoperatively     Patient's self reported past medical history, medications, allergies, surgical history, family and social history as well as a 10 point review of systems has been documented in the new patient intake form and scanned into the patient's electronic medical record. Pertinent findings are documented in the HPI.     Physical Examination Findings:  Constitutional: Appears well-developed and well-nourished.  Head: Normocephalic and atraumatic.  Eyes: Pupils are equal and round.  Cardiovascular: Intact distal pulses.   Respiratory: Effort normal. No respiratory distress.  Neurologic: Alert and oriented to person, place, and time.  Skin: Skin is warm and dry.  Hematologic / Lymphatic: No lymphedema, lymphangitis.  Psychiatric: normal mood and affect. Behavior is normal.   Musculoskeletal: Right knee with tenderness over medial and lateral joint line. Positive retropatellar crepitus. Calf is supple and nontender. Range of motion 0-100 degrees. No effusion.  Forward arm elevation 110 degrees abduction 90 degrees.  External rotation at neutral internal rotation T12.  Pain with more terminal range of motion     New x-rays taken today of the right shoulder reveal healed malunion of right proximal humerus with glenohumeral joint changes, no acute findings    Impression: Right knee osteoarthritis, right shoulder glenohumeral arthritis with malunion     Plan: Steroid injection was performed today upon patient request for the right knee. Injection was given and tolerated well. She will continue to monitor symptoms and return to our office as needed.  In  regards to her right shoulder she is not interested in any surgical invention we discussed conservative treatment measures I recommend her getting started back into formal physical therapy.  We also discussed possible glenohumeral joint injection if she continues to have pain.  She will get with physical therapy and follow-up with our office as needed     Patient ID: Shila Venegas is a 84 y.o. female.    L Inj/Asp: R knee on 5/7/2025 11:24 AM  Indications: pain  Details: 22 G needle, lateral approach  Medications: 4 mL lidocaine 10 mg/mL (1 %); 40 mg triamcinolone acetonide 40 mg/mL  Procedure, treatment alternatives, risks and benefits explained, specific risks discussed. Immediately prior to procedure a time out was called to verify the correct patient, procedure, equipment, support staff and site/side marked as required.         Guera Ariza PA-C  Department of Orthopaedic Surgery  [facility]     Dictation performed with the use of voice recognition software. Syntax and grammatical errors may exist.

## 2025-05-08 ENCOUNTER — APPOINTMENT (OUTPATIENT)
Dept: PHYSICAL THERAPY | Facility: CLINIC | Age: 85
End: 2025-05-08
Payer: MEDICARE

## 2025-05-08 DIAGNOSIS — M54.16 LUMBAR RADICULOPATHY: Primary | ICD-10-CM

## 2025-05-08 PROCEDURE — 97535 SELF CARE MNGMENT TRAINING: CPT | Mod: GP | Performed by: PHYSICAL THERAPIST

## 2025-05-08 PROCEDURE — 97110 THERAPEUTIC EXERCISES: CPT | Mod: GP | Performed by: PHYSICAL THERAPIST

## 2025-05-08 ASSESSMENT — PAIN SCALES - GENERAL: PAINLEVEL_OUTOF10: 5 - MODERATE PAIN

## 2025-05-08 NOTE — PROGRESS NOTES
Physical Therapy  Physical Therapy Recheck / Discharge summary    Patient Name: Shila Venegas  MRN: [id number]  Today's Date: 5/8/2025  Time Calculation  Start Time: 1350  Stop Time: 1414  Time Calculation (min): 24 min    Insurance:  Visit number: 7 of [place]  Authorization info: No auth  Insurance Type: 2025 BENEFIT / NO AUTH / NO OOP / MED NEC / MEDICARE AND [employer] MEDICARE SUPPLEMENT PLAN F / [employer]# [id number]  Cert date start: 1/22/25  Cert date end: 4/22/25              Recert dates: 5/8/2025 - 5/9/2025    General   Reason for visit: spinal stenosis of lumbar region with neurogenic claudication    Referred by: Shawna Whitaker MD     Current Problem  1. Lumbar radiculopathy  Follow Up In Physical Therapy          Relevant Past Medical History:Reviewed in chart; closed intertrochanteric fracture of femur, compression fracture of T11 vertebra, diverticulitis, DJD of knee, hearing loss, hyperlipidemia, idiopathic osteoporosis, IBS, lumbar stenosis, primary OA of L hip, pulmonary hypertension, anemia, B LE edema, chronic venous hypertension with inflammation, closed fracture of multiple pubic rami left, closed fracture of multiple ribs of left side, closed wedge compression fracture of T11 vertebrae, degenerative scoliosis, fall, fracture pf pubis closed, closed nondisplaced fracture of greater tuberosity of humerus, lumbar spondylosis, muscle weakness, plantar fascitis, posterior tibial tendon dysfunction, postherpetic neuralgia, proximal humerus fracture, venous insufficiency chronic, atherosclerosis of aorta, stage 3 chronic kidney disease      0-10 (Numeric) Pain Score: 5 - Moderate pain    Subjective:   Patient reports her back has been feeling ok- about 3/4 of the way throughout the day, her back will start to bother her. She feels that other parts of her body are more limited at this time (knee and shoulder - which she was seen by orthopedics for yesterday, and plans to begin therapy at a facility closer to  "her home in the near future for her shoulder). \"The back hasn't been that bad, but by the end of the day it is just tired. 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.\"     HEP Performed:  yes    Average pain: 5/10; no longer has pain down her leg    Objective:   Range of Motion:        LUMBAR ROM AROM    LEFT RIGHT   Sidebend 50% limited, pain free  50% limited, pain free    Rotation 50% limited, pain free 50% limited, mild pain   Flexion 25% limited, pain free    Extension To neutral with mild pain in buttock      Strength:  HIP MMT LEFT RIGHT   Flexion 4/5 4/5   Adduction  4/5 4/5   Abduction 4-/5 4-/5      KNEE MMT LEFT RIGHT   Flexion 4/5 4/5   Extension 4/5 4/5      ANKLE MMT LEFT RIGHT   Dorsiflexion 5/5 5/5      Special Testing:  SPECIAL TEST LEFT RIGHT   Slump Negative  Negative      Outcome measures:  NASIMA  Eval: 5/50  DC: 7/50      Treatments:   - recheck  - objective measures  - reviewed HEP (aquatic and gym based)  - provided handout for local [employer] for aquatic HEP carry over through silver sneakers    Charges:  1- TE  1- Self care    Assessment:   Patient has made good progress towards her goals as she demonstrates centralization of symptoms, improved lumbar AROM, independence with a HEP, and progression in LE strength. Despite still having some symptoms towards the end of the day that could be addressed with a gym based program and continued care, patient is requesting discharge at this time as she plans to perform aquatic exercises independently at a local pool, and also plans to begin outpatient physical therapy for her shoulder pain at a  clinic closer to her home. For these reasons, patient is being discharged to a [facility].       Goals:  Active         PT Problem         PT Goals- STG         Start:  01/22/25    Expected End:  04/22/25        SHORT TERM GOALS:  1) Pt will report decrease in pain from 5/10 to </= 3/10 to improve quality of life. - goal not met  2) Pt will maintain " centralization of symptoms in order to demonstrate improved spinal stability.- goal met   3) Pt will demonstrate painfree lumbar AROM in all planes in order improve mobility. - progressed towards goal; mild pain with extension            PT Goals- LTG         Start:  01/22/25    Expected End:  04/22/25        LONG TERM GOALS:  1) Pt will be independent with HEP to promote self management of condition. - goal met   2) Pt will improve LE strength to >/= 4+/5 in order to improve LE stability and improved functional mobility. - progressed towards goal   3) Pt will demonstrate improved gait mechanics without gait deviation and using least restrictive device to promote safe community ambulation. - patient only utilizes a cane to perform stair negotiation at home. Otherwise ambulates independently. Does still ambulate with a forward flexed posture.             Plan:   Patient requesting discharge as she would like to perform the aquatic exercises independently and plans to start therapy for her R shoulder in the near future.     Taylor Baker, PT

## 2025-05-27 ENCOUNTER — APPOINTMENT (OUTPATIENT)
Dept: PRIMARY CARE | Facility: CLINIC | Age: 85
End: 2025-05-27
Payer: MEDICARE

## 2025-05-27 VITALS — BODY MASS INDEX: 31.05 KG/M2 | WEIGHT: 159 LBS

## 2025-05-27 DIAGNOSIS — Z12.31 ENCOUNTER FOR SCREENING MAMMOGRAM FOR MALIGNANT NEOPLASM OF BREAST: ICD-10-CM

## 2025-05-27 DIAGNOSIS — E55.9 VITAMIN D DEFICIENCY: ICD-10-CM

## 2025-05-27 DIAGNOSIS — R53.81 MALAISE: ICD-10-CM

## 2025-05-27 DIAGNOSIS — I35.0 AORTIC VALVE STENOSIS, ETIOLOGY OF CARDIAC VALVE DISEASE UNSPECIFIED: ICD-10-CM

## 2025-05-27 DIAGNOSIS — I10 BENIGN ESSENTIAL HYPERTENSION: Primary | ICD-10-CM

## 2025-05-27 DIAGNOSIS — Z12.11 COLON CANCER SCREENING: ICD-10-CM

## 2025-05-27 DIAGNOSIS — E78.2 MIXED HYPERLIPIDEMIA: ICD-10-CM

## 2025-05-27 PROCEDURE — 1036F TOBACCO NON-USER: CPT | Performed by: INTERNAL MEDICINE

## 2025-05-27 PROCEDURE — G2211 COMPLEX E/M VISIT ADD ON: HCPCS | Performed by: INTERNAL MEDICINE

## 2025-05-27 PROCEDURE — 1125F AMNT PAIN NOTED PAIN PRSNT: CPT | Performed by: INTERNAL MEDICINE

## 2025-05-27 PROCEDURE — 1159F MED LIST DOCD IN RCRD: CPT | Performed by: INTERNAL MEDICINE

## 2025-05-27 PROCEDURE — 1160F RVW MEDS BY RX/DR IN RCRD: CPT | Performed by: INTERNAL MEDICINE

## 2025-05-27 PROCEDURE — 99214 OFFICE O/P EST MOD 30 MIN: CPT | Performed by: INTERNAL MEDICINE

## 2025-05-27 ASSESSMENT — PAIN SCALES - GENERAL: PAINLEVEL_OUTOF10: 5

## 2025-05-27 NOTE — PROGRESS NOTES
Subjective   Patient ID: Shila Venegas is a 84 y.o. female who presents for Follow-up.  History of Present Illness  The patient presents for a routine checkup.    She has not experienced any falls within the past year. She maintains a living will and healthcare power of , although a copy is not available at this time. Her physical activity is limited, but she engages in daily stair climbing. She recently consulted an ophthalmologist but does not have regular dental check-ups. She regularly visits a dermatologist due to a recent leg lesion removal. She has not undergone a mammogram and is uncertain about the due date for her next one. She reports no headaches, dizziness, sinus issues, chest pain, or shortness of breath.     She experienced a mild stomach upset this week, characterized by frequent bowel movements without nausea or diarrhea, which has since improved. She also reported excessive gas. She has not received her COVID-19 booster vaccine due to previous adverse reactions. Her last colonoscopy was in 2019, and she is currently without a gastroenterologist as her previous doctor has retired. She has lost some weight and aims to maintain it around 160 pounds, although her last recorded weight was 165 pounds. She is not interested in undergoing another colonoscopy unless deemed necessary. She is on atorvastatin, losartan, metoprolol, alendronate once a week, calcium, vitamin D, triamterene, omeprazole, and aspirin.    She has arthritis, primarily affecting her knees and shoulders, which was exacerbated following an accident in [place]. She developed upper back pain approximately 2 weeks ago, which she describes as similar to a previous episode of shingles. She manages her pain with two ibuprofen tablets in the morning.    She has been seeing Dr. Greene for swelling and wound care every 9 months. She has completed water therapy for her back.    She recalls a previous episode of toe discomfort during a  rheumatology visit, which was attributed to elevated uric acid levels. A one-week course of steroids provided some relief, but her toes remain symptomatic.      PMHx, FHx, Social Hx, Surg Hx personally reviewed at this appointment. No pertinent findings and/or changes from prior (if applicable).    ROS: Unless specified above, pt denies wt gain/loss f/c HA LoC CP SOB NVDC. See HPI above, and scanned sheet (if applicable). All other systems are reviewed and are without complaint.   Review of Systems   Objective     There were no vitals taken for this visit.     Physical Exam  General Appearance: Normal appearance.    Pulmonary: Clear to auscultation, no wheezing, rales or rhonchi.  Cardiovascular: Regular rate and rhythm, no murmurs, rubs, or gallops.  Abdominal: There is no distension. There is no mass. There is no abdominal tenderness. There is no guarding  Musculoskeletal: Right lower leg: No edema. Left lower leg: No edema.  Extremities: Baseline edema.  Neurological: Alert.  Other observations: No signs of shingles.  Physical Exam     Lab Results   Component Value Date    WBC 6.4 01/27/2025    HGB 12.0 01/27/2025    HCT 38.7 01/27/2025     01/27/2025    CHOL 135 06/17/2024    TRIG 121 06/17/2024    HDL 47.7 06/17/2024    ALT 7 06/17/2024    AST 11 06/17/2024     01/27/2025    K 4.8 01/27/2025     01/27/2025    CREATININE 0.93 01/27/2025    BUN 30 (H) 01/27/2025    CO2 26 01/27/2025    TSH 0.80 06/17/2024    INR 1.1 06/19/2018     par     Current Outpatient Medications   Medication Instructions    acetaminophen (Acetaminophen Extra Strength) 500 mg tablet 2 tablet 2 TIMES DAILY (route: oral)    alendronate (Fosamax) 70 mg tablet TAKE 1 TAB EVERY 7 DAYS IN MORNING W/FULL GLASS WATER ON EMPTY STOMACH. NOTHING BY MOUTH OR DO NOT LIE DOWN X 30 MIN    aspirin (Adult Low Dose Aspirin) 81 mg EC tablet 1 tablet DAILY (route: oral)    atorvastatin (LIPITOR) 10 mg, oral, Daily    calcium  carbonate-vitamin D3 500 mg-5 mcg (200 unit) tablet 1 tablet, oral, Daily    ibuprofen 400 mg, Daily    losartan (COZAAR) 25 mg, oral, Daily    meloxicam (MOBIC) 7.5 mg, oral, Daily, with food    metoprolol succinate XL (TOPROL-XL) 12.5 mg, oral, Daily    omeprazole (PRILOSEC) 40 mg, oral, Daily, LAST REFILL TILL APPT IS MADE    triamterene-hydrochlorothiazid (Maxzide) 75-50 mg tablet 1 tablet, oral, Daily        XR shoulder right 2+ views  Narrative: Interpreted By:  Liana Burris,   STUDY:  XR SHOULDER RIGHT 2+ VIEWS;  5/7/2025 10:32 am      INDICATION:  Signs/Symptoms:right shoulder pain.      COMPARISON:  07/11/2022      ACCESSION NUMBER(S):  [id number]      ORDERING CLINICIAN:  IVY STUART      FINDINGS:  Remote healed fracture of the proximal humerus is seen.      There is no evidence for acute fracture or dislocation. Remote healed  right-sided rib fracture is also noted.      Osteopenia is present. Hypertrophic bony changes are seen at the  acromioclavicular joint, which narrow the joint.  Narrowing of the  glenohumeral joint is seen with associated sclerosis and spur  formation.      No lytic or blastic lesions are seen.      Impression: Remote healed fracture of the proximal humerus.      Degenerative changes.          MACRO:  None      Signed by: Liana Burris 5/8/2025 12:52 PM  Dictation workstation:   [id number]           Assessment & Plan  1. Health maintenance.  - Due for a mammogram in July.  - Blood work and urine tests are scheduled for July.  - Mammogram is scheduled for July..  Bone density has been done    2. Arthritis.  - Reports arthritis pain primarily in knees and currently in shoulder.  - Takes two ibuprofen in the morning for pain management.  - Pain in upper back developed about 2 weeks ago.  - Advised to monitor the condition and report any lack of improvement.    3. Upper back pain.  - Developed upper back pain about 2 weeks ago, initially thought to be shingles.  - Examination  determined it is not shingles.  - Advised to monitor the condition and report any lack of improvement.  - No signs of kidney pain.    4. Gout.  - Had a flare-up of gout in toes, treated with a steroid by rheumatologist.  - Uric acid levels will be checked again.  - Reports toes still do not feel normal.  #5.  Hypertension-stable on present medications.  Hyperlipidemia-we will check a fasting lipid profile.  Osteoporosis-she will take medication as prescribed.  Follow-up with rheumatology  Aortic stenosis-will check an echocardiogram.  Follow-up  - Follow up in 3 months.          Ariel Martínez MD       This medical note was created with the assistance of artificial intelligence (AI) for documentation purposes. The content has been reviewed and confirmed by the healthcare provider for accuracy and completeness. Patient consented to the use of audio recording and use of AI during their visit.

## 2025-05-27 NOTE — PATIENT INSTRUCTIONS
Please take medication as prescribed.  Follow-up in 3 months.  Obtain blood work.  Schedule your echo, and your mammogram.  You be notified about a GI appointment.

## 2025-07-07 DIAGNOSIS — I10 BENIGN ESSENTIAL HYPERTENSION: ICD-10-CM

## 2025-07-08 RX ORDER — TRIAMTERENE AND HYDROCHLOROTHIAZIDE 75; 50 MG/1; MG/1
1 TABLET ORAL DAILY
Qty: 90 TABLET | Refills: 3 | Status: SHIPPED | OUTPATIENT
Start: 2025-07-08

## 2025-07-09 ENCOUNTER — EVALUATION (OUTPATIENT)
Dept: PHYSICAL THERAPY | Facility: CLINIC | Age: 85
End: 2025-07-09
Payer: MEDICARE

## 2025-07-09 DIAGNOSIS — S42.254D CLOSED NONDISPLACED FRACTURE OF GREATER TUBEROSITY OF RIGHT HUMERUS WITH ROUTINE HEALING, SUBSEQUENT ENCOUNTER: ICD-10-CM

## 2025-07-09 PROCEDURE — 97161 PT EVAL LOW COMPLEX 20 MIN: CPT | Mod: GP

## 2025-07-09 PROCEDURE — 97110 THERAPEUTIC EXERCISES: CPT | Mod: GP

## 2025-07-09 NOTE — PROGRESS NOTES
Physical Therapy    Physical Therapy Evaluation and Treatment    Patient Name: Shila Venegas  MRN: [id number]  Today's Date: 7/9/2025  Time Calculation  Start Time: 1250  Stop Time: 1330  Time Calculation (min): 40 min    Insurance:  Visit number: 1  Authorization info: [place] auth, [place]  Ins[place] Type: Payor: MEDICARE / Plan: MEDICARE PART A AND B / Product Type: *No Product type* /     Current Problem  1. Closed nondisplaced fracture of greater tuberosity of right humerus with routine healing, subsequent encounter  Referral to Physical Therapy    Follow Up In Physical Therapy          General  85 yoF presenting to therapy for s/p closed nondisplaced fracture of greater tuberosity of Right UE with routine healing. Pt fractured humerus in 2021 after an accidental fall. She completed OT at this facility with some improvement, but her pain has returned recently. Her pain and functional limitations are impacting her iADL's, ADL's and occupation. Her PROM is better than AROM in all planes. However, her rotator cuff activation is poor. Trace amounts of strength in resisted ER at 0 (infraspinatus). Primary goal of therapy is to reduce pain and maximize functional independence.     Precautions  Osteopenia/osteoporosis    SUBJECTIVE:   Referred for: closed nondisplaced fracture of greater tuberosity of Right UE with routine healing  Referred by: Guera Ariza PA-C  History: Accidental fall in 2021 fracturing her Right humerus. Fracture managed non-operatively. She completed OT at this facility and made good progress. 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  There is no evidence for acute fracture or dislocation. Remote healed  right-sided rib fracture is also noted.  Osteopenia is present. Hypertrophic bony changes are seen at the  acromioclavicular joint, which narrow the joint.  Narrowing of the  glenohumeral joint is seen with associated sclerosis and spur  formation.  No lytic or blastic lesions are seen.      IMPRESSION:  Remote healed fracture of the proximal humerus.  Degenerative changes.    OBJECTIVE:    Observation:    Palpation: active trigger points through infraspinatus    Shoulder AROM (unaffected UE WFL)  Flex: 90, pulling  Abd:  90, pain  Ext: 50, pain  ER at 0: lacking 10 deg  Hand behind head: unable  Hand behind back: unable  Hand across body: to acromion, pulls    PROM > AROM all planes    Glenohumeral Joint Mobility: hypomobile AP, inferior    Elbow AROM: WFL all planes bilaterally    Shoulder Strength:  Flex: 2-/5  Abd: 2-/5  ER at 0: 1/5  IR at 0: 3+/5    Elbow Strength:   Flex: 4-/5  Ext: 4-/5    Treatments:  Therapeutic Exercise: (15 minutes)   Instruction in HEP   - Seated bicep curl YTB  - Seated self anchored triceps ext YTB  - Seated row YTB  - Supine wand flex  - Supine active chest press  - Seated wand ER   - Isometrics abd/IR     HEP: (I forgot to add seated wand ER to printout)  Access Code: [email or web address]  URL: [email or web address]  Date: 07/09/2025  Prepared by: Haven Malagon    Exercises  - Standing Single Arm Elbow Flexion with Resistance  - 1 x daily - 7 x weekly - 2-3 sets - 10 reps  - Standing Elbow Extension with Self-Anchored Resistance  - 1 x daily - 7 x weekly - 2-3 sets - 10 reps  - Standing Shoulder Row with Anchored Resistance  - 1 x daily - 7 x weekly - 2-3 sets - 10 reps  - Supine Shoulder Press  - 1 x  daily - 7 x weekly - 2-3 sets - 10 reps  - Supine Shoulder Flexion Extension AAROM with Dowel  - 1 x daily - 7 x weekly - 2-3 sets - 10 reps  - Standing Shoulder Internal Rotation AAROM with Dowel  - 1 x daily - 7 x weekly - 2-3 sets - 10 reps  - Standing Isometric Shoulder Internal Rotation at Doorway  - 1 x daily - 7 x weekly - 2-3 sets - 10 reps - 3 hold  - Isometric Shoulder Abduction at Wall  - 1 x daily - 7 x weekly - 2-3 sets - 10 reps - 3 hold    Response to treatment: good, no adverse reaction    ASSESSMENT:   85 yoF presenting to therapy for s/p closed nondisplaced fracture of greater tuberosity of Right UE with routine healing. Pt fractured humerus in 2021 after an accidental fall. She completed OT at this facility with some improvement, but her pain has returned recently. Her pain and functional limitations are impacting her iADL's, ADL's and occupation. Her PROM is better than AROM in all planes. However, her rotator cuff activation is poor. Trace amounts of strength in resisted ER at 0 (infraspinatus). Primary goal of therapy is to reduce pain and maximize functional independence.     PLAN:  OP PT PLAN:  Treatment/Interventions: Cryotherapy , Education/Instruction , Manual Therapy  , Neuromuscular re-education , Therapeutic activities , Therapeutic exercise  , and Vaspneumatic device therapy  PT Plan: Skilled PT   PT Frequency: 1-2 times per week  Duration:8-10 visits  Certification Period Start Date: 7/9/25  Certification Period End Date: 10/7/25  Visits Approved: OSCAR romero  Rehab Potential: Good  Plan of Care Agreement: Patient         Goals:   Pt will report minimal to no pain with all iADL's, ADL's and functional activities to allow for full participation in daily activities  AROM of affected extremity will be 75% of WFL in all planes to allow for increased participation with iADL's, ADL's, and functional mobility tasks  Strength of affected extremity will be 3/5 or greater in all planes to allow  for full participation in iADL's, ADL's, and functional mobility tasks  Pt will be independent with home exercise program to allow for program progression and discharge  Outcome score reported will improve by MCID to indicate an improvement in overall function  Pt will be independent with reaching behind her head to allow her to wash her own hair without daughter assist  Pt will be able to eat independently without assist from Left UE secondary to an improvement in UE strength

## 2025-07-21 ENCOUNTER — HOSPITAL ENCOUNTER (OUTPATIENT)
Dept: RADIOLOGY | Facility: CLINIC | Age: 85
Discharge: HOME | End: 2025-07-21
Payer: MEDICARE

## 2025-07-21 VITALS — WEIGHT: 160 LBS | BODY MASS INDEX: 31.41 KG/M2 | HEIGHT: 60 IN

## 2025-07-21 DIAGNOSIS — Z12.31 ENCOUNTER FOR SCREENING MAMMOGRAM FOR MALIGNANT NEOPLASM OF BREAST: ICD-10-CM

## 2025-07-21 PROCEDURE — 77063 BREAST TOMOSYNTHESIS BI: CPT | Performed by: RADIOLOGY

## 2025-07-21 PROCEDURE — 77067 SCR MAMMO BI INCL CAD: CPT | Performed by: RADIOLOGY

## 2025-07-21 PROCEDURE — 77067 SCR MAMMO BI INCL CAD: CPT

## 2025-07-22 DIAGNOSIS — I10 BENIGN ESSENTIAL HYPERTENSION: Primary | ICD-10-CM

## 2025-07-22 DIAGNOSIS — R53.81 MALAISE: ICD-10-CM

## 2025-07-22 LAB
ALBUMIN/CREAT UR: 15 MG/G CREAT
CREAT UR-MCNC: 80 MG/DL (ref 20–275)
MICROALBUMIN UR-MCNC: 1.2 MG/DL

## 2025-07-22 NOTE — PROGRESS NOTES
Subjective   Patient ID: Shila Venegas is a 85 y.o. female who presents for No chief complaint on file..  History of Present Illness        PMHx, FHx, Social Hx, Surg Hx personally reviewed at this appointment. No pertinent findings and/or changes from prior (if applicable).    ROS: Unless specified above, pt denies wt gain/loss f/c HA LoC CP SOB NVDC. See HPI above, and scanned sheet (if applicable). All other systems are reviewed and are without complaint.     Objective     There were no vitals taken for this visit.     Physical Exam        Lab Results   Component Value Date    WBC 6.6 07/21/2025    HGB 11.5 (L) 07/21/2025    HCT 36.9 07/21/2025     07/21/2025    CHOL 140 07/21/2025    TRIG 78 07/21/2025    HDL 52 07/21/2025    ALT 9 07/21/2025    AST 11 07/21/2025     07/21/2025    K 4.4 07/21/2025     07/21/2025    CREATININE 1.03 (H) 07/21/2025    BUN 37 (H) 07/21/2025    CO2 27 07/21/2025    TSH 1.31 07/21/2025    INR 1.1 06/19/2018     par     Current Outpatient Medications   Medication Instructions    acetaminophen (Acetaminophen Extra Strength) 500 mg tablet 2 tablet 2 TIMES DAILY (route: oral)    alendronate (Fosamax) 70 mg tablet TAKE 1 TAB EVERY 7 DAYS IN MORNING W/FULL GLASS WATER ON EMPTY STOMACH. NOTHING BY MOUTH OR DO NOT LIE DOWN X 30 MIN    aspirin (Adult Low Dose Aspirin) 81 mg EC tablet 1 tablet DAILY (route: oral)    atorvastatin (LIPITOR) 10 mg, oral, Daily    calcium carbonate-vitamin D3 500 mg-5 mcg (200 unit) tablet 1 tablet, oral, Daily    ibuprofen 400 mg, Daily    losartan (COZAAR) 25 mg, oral, Daily    metoprolol succinate XL (TOPROL-XL) 12.5 mg, oral, Daily    omeprazole (PRILOSEC) 40 mg, oral, Daily, LAST REFILL TILL APPT IS MADE    triamterene-hydrochlorothiazid (Maxzide) 75-50 mg tablet 1 tablet, oral, Daily        XR shoulder right 2+ views  Narrative: Interpreted By:  Liana Burris,   STUDY:  XR SHOULDER RIGHT 2+ VIEWS;  5/7/2025 10:32 am       INDICATION:  Signs/Symptoms:right shoulder pain.      COMPARISON:  07/11/2022      ACCESSION NUMBER(S):  [id number]      ORDERING CLINICIAN:  IVY STUART      FINDINGS:  Remote healed fracture of the proximal humerus is seen.      There is no evidence for acute fracture or dislocation. Remote healed  right-sided rib fracture is also noted.      Osteopenia is present. Hypertrophic bony changes are seen at the  acromioclavicular joint, which narrow the joint.  Narrowing of the  glenohumeral joint is seen with associated sclerosis and spur  formation.      No lytic or blastic lesions are seen.      Impression: Remote healed fracture of the proximal humerus.      Degenerative changes.          MACRO:  None      Signed by: Liana Burris 5/8/2025 12:52 PM  Dictation workstation:   [id number]           Assessment & Plan            Ariel Martínez MD       This medical note was created with the assistance of artificial intelligence (AI) for documentation purposes. The content has been reviewed and confirmed by the healthcare provider for accuracy and completeness. Patient consented to the use of audio recording and use of AI during their visit.

## 2025-07-25 LAB
25(OH)D3+25(OH)D2 SERPL-MCNC: 37 NG/ML (ref 30–100)
ALBUMIN SERPL-MCNC: 4.1 G/DL (ref 3.6–5.1)
ALP SERPL-CCNC: 97 U/L (ref 37–153)
ALT SERPL-CCNC: 9 U/L (ref 6–29)
ANION GAP SERPL CALCULATED.4IONS-SCNC: 9 MMOL/L (CALC) (ref 7–17)
APPEARANCE UR: CLEAR
AST SERPL-CCNC: 11 U/L (ref 10–35)
BACTERIA #/AREA URNS HPF: ABNORMAL /HPF
BASOPHILS # BLD AUTO: 59 CELLS/UL (ref 0–200)
BASOPHILS NFR BLD AUTO: 0.9 %
BILIRUB SERPL-MCNC: 0.4 MG/DL (ref 0.2–1.2)
BILIRUB UR QL STRIP: NEGATIVE
BUN SERPL-MCNC: 37 MG/DL (ref 7–25)
CALCIUM SERPL-MCNC: 9.5 MG/DL (ref 8.6–10.4)
CHLORIDE SERPL-SCNC: 106 MMOL/L (ref 98–110)
CHOLEST SERPL-MCNC: 140 MG/DL
CHOLEST/HDLC SERPL: 2.7 (CALC)
CO2 SERPL-SCNC: 27 MMOL/L (ref 20–32)
COLOR UR: YELLOW
CREAT SERPL-MCNC: 1.03 MG/DL (ref 0.6–0.95)
EGFRCR SERPLBLD CKD-EPI 2021: 53 ML/MIN/1.73M2
EOSINOPHIL # BLD AUTO: 264 CELLS/UL (ref 15–500)
EOSINOPHIL NFR BLD AUTO: 4 %
ERYTHROCYTE [DISTWIDTH] IN BLOOD BY AUTOMATED COUNT: 14 % (ref 11–15)
FERRITIN SERPL-MCNC: 65 NG/ML (ref 16–288)
FOLATE SERPL-MCNC: 7.6 NG/ML
GLUCOSE SERPL-MCNC: 93 MG/DL (ref 65–99)
GLUCOSE UR QL STRIP: NEGATIVE
HCT VFR BLD AUTO: 36.9 % (ref 35–45)
HDLC SERPL-MCNC: 52 MG/DL
HGB BLD-MCNC: 11.5 G/DL (ref 11.7–15.5)
HGB UR QL STRIP: NEGATIVE
HYALINE CASTS #/AREA URNS LPF: ABNORMAL /LPF
IRON SATN MFR SERPL: 15 % (CALC) (ref 16–45)
IRON SERPL-MCNC: 50 MCG/DL (ref 45–160)
KETONES UR QL STRIP: NEGATIVE
LDLC SERPL CALC-MCNC: 72 MG/DL (CALC)
LEUKOCYTE ESTERASE UR QL STRIP: ABNORMAL
LYMPHOCYTES # BLD AUTO: 680 CELLS/UL (ref 850–3900)
LYMPHOCYTES NFR BLD AUTO: 10.3 %
MCH RBC QN AUTO: 28.5 PG (ref 27–33)
MCHC RBC AUTO-ENTMCNC: 31.2 G/DL (ref 32–36)
MCV RBC AUTO: 91.3 FL (ref 80–100)
MONOCYTES # BLD AUTO: 403 CELLS/UL (ref 200–950)
MONOCYTES NFR BLD AUTO: 6.1 %
NEUTROPHILS # BLD AUTO: 5194 CELLS/UL (ref 1500–7800)
NEUTROPHILS NFR BLD AUTO: 78.7 %
NITRITE UR QL STRIP: NEGATIVE
NONHDLC SERPL-MCNC: 88 MG/DL (CALC)
PH UR STRIP: 6 [PH] (ref 5–8)
PLATELET # BLD AUTO: 224 THOUSAND/UL (ref 140–400)
PMV BLD REES-ECKER: 11.1 FL (ref 7.5–12.5)
POTASSIUM SERPL-SCNC: 4.4 MMOL/L (ref 3.5–5.3)
PROT SERPL-MCNC: 6.7 G/DL (ref 6.1–8.1)
PROT UR QL STRIP: NEGATIVE
RBC # BLD AUTO: 4.04 MILLION/UL (ref 3.8–5.1)
RBC #/AREA URNS HPF: ABNORMAL /HPF
SERVICE CMNT-IMP: ABNORMAL
SODIUM SERPL-SCNC: 142 MMOL/L (ref 135–146)
SP GR UR STRIP: 1.02 (ref 1–1.03)
SQUAMOUS #/AREA URNS HPF: ABNORMAL /HPF
TIBC SERPL-MCNC: 330 MCG/DL (CALC) (ref 250–450)
TRIGL SERPL-MCNC: 78 MG/DL
TSH SERPL-ACNC: 1.31 MIU/L (ref 0.4–4.5)
URATE SERPL-MCNC: 7.8 MG/DL (ref 2.5–7)
VIT B12 SERPL-MCNC: 254 PG/ML (ref 200–1100)
WBC # BLD AUTO: 6.6 THOUSAND/UL (ref 3.8–10.8)
WBC #/AREA URNS HPF: ABNORMAL /HPF

## 2025-07-29 NOTE — PROGRESS NOTES
Physical Therapy    Physical Therapy Evaluation and Treatment    Patient Name: Shila Venegas  MRN: [id number]  Today's Date: 7/30/2025  Time Calculation  Start Time: 0745  Stop Time: 0830  Time Calculation (min): 45 min    Insurance:  Visit number: 2  Authorization info: [place] auth, [place]  Insurance Type: Payor: MEDICARE / Plan: MEDICARE PART A AND B / Product Type: *No Product type* /     Current Problem  1. Closed nondisplaced fracture of greater tuberosity of right humerus with routine healing, subsequent encounter        2. Lumbar radiculopathy  Follow Up In Physical Therapy          General  85 yoF presenting to therapy for s/p closed nondisplaced fracture of greater tuberosity of Right UE with routine healing. Pt fractured humerus in 2021 after an accidental fall. She completed OT at this facility with some improvement, but her pain has returned recently. Her pain and functional limitations are impacting her iADL's, ADL's and occupation. Her PROM is better than AROM in all planes. However, her rotator cuff activation is poor. Trace amounts of strength in resisted ER at 0 (infraspinatus). Primary goal of therapy is to reduce pain and maximize functional independence.     Precautions  Osteopenia/osteoporosis    SUBJECTIVE:     Patient reports that she has a difficult time rotating her hand out.  Rosie been doing my exercises.  Going on cruise in a week and half.  Referred for: closed nondisplaced fracture of greater tuberosity of Right UE with routine healing  Referred by: Guera Ariza PA-C  History: Accidental fall in 2021 fracturing her Right humerus. Fracture managed non-operatively. She completed OT at this facility and made good progress. 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  There is no evidence for acute fracture or dislocation. Remote healed  right-sided rib fracture is also noted.  Osteopenia is present. Hypertrophic bony changes are seen at the  acromioclavicular joint, which narrow the joint.  Narrowing of the  glenohumeral joint is seen with associated sclerosis and spur  formation.  No lytic or blastic lesions are seen.      IMPRESSION:  Remote healed fracture of the proximal humerus.  Degenerative changes.    OBJECTIVE:    Observation:    Palpation: active trigger points through infraspinatus    Shoulder AROM (unaffected UE WFL)  Flex: 90, pulling  Abd:  90, pain  Ext: 50, pain  ER at 0: lacking 10 deg  Hand behind head: unable  Hand behind back: unable  Hand across body: to acromion, pulls    PROM > AROM all planes    Glenohumeral Joint Mobility: hypomobile AP, inferior    Elbow AROM: WFL all planes bilaterally    Shoulder Strength:  Flex: 2-/5  Abd: 2-/5  ER at 0: 1/5  IR at 0: 3+/5    Elbow Strength:   Flex: 4-/5  Ext: 4-/5    Treatments:  Therapeutic Exercise:   Pulleys x 3 min  Standing ER stretch with strap and wand  Supine wand flexion x 10   Supine wand press up 2# 2 x 10   Standing Rows YTB 2 x 10   Review all HEP    Manual:  PROM and sTM to R shoulder, UT, bicep     HEP: (I forgot to add seated wand ER to printout)  Access Code: [email or web address]  URL: [email or web address]  Date: 07/09/2025  Prepared by: Haven Malagon    Exercises  - Standing Single Arm Elbow Flexion with Resistance  - 1 x daily - 7 x weekly - 2-3 sets - 10 reps  - Standing Elbow Extension with Self-Anchored Resistance  - 1 x daily - 7 x weekly -  2-3 sets - 10 reps  - Standing Shoulder Row with Anchored Resistance  - 1 x daily - 7 x weekly - 2-3 sets - 10 reps  - Supine Shoulder Press  - 1 x daily - 7 x weekly - 2-3 sets - 10 reps  - Supine Shoulder Flexion Extension AAROM with Dowel  - 1 x daily - 7 x weekly - 2-3 sets - 10 reps  - Standing Shoulder Internal Rotation AAROM with Dowel  - 1 x daily - 7 x weekly - 2-3 sets - 10 reps  - Standing Isometric Shoulder Internal Rotation at Doorway  - 1 x daily - 7 x weekly - 2-3 sets - 10 reps - 3 hold  - Isometric Shoulder Abduction at Wall  - 1 x daily - 7 x weekly - 2-3 sets - 10 reps - 3 hold    Response to treatment: good, no adverse reaction    ASSESSMENT:   Patient tolerated session well with no increase in pain. She has passive and active assisted ER but no active ER.  Primary goal of therapy is to reduce pain and maximize functional independence.     PLAN:  OP PT PLAN:  Treatment/Interventions: Cryotherapy , Education/Instruction , Manual Therapy  , Neuromuscular re-education , Therapeutic activities , Therapeutic exercise  , and Vaspneumatic device therapy  PT Plan: Skilled PT   PT Frequency: 1-2 times per week  Duration:8-10 visits  Certification Period Start Date: 7/9/25  Certification Period End Date: 10/7/25  Visits Approved: OSCAR romero  Rehab Potential: Good  Plan of Care Agreement: Patient         Goals:   Pt will report minimal to no pain with all iADL's, ADL's and functional activities to allow for full participation in daily activities  AROM of affected extremity will be 75% of WFL in all planes to allow for increased participation with iADL's, ADL's, and functional mobility tasks  Strength of affected extremity will be 3/5 or greater in all planes to allow for full participation in iADL's, ADL's, and functional mobility tasks  Pt will be independent with home exercise program to allow for program progression and discharge  Outcome score reported will improve by MCID to indicate an improvement in  overall function  Pt will be independent with reaching behind her head to allow her to wash her own hair without daughter assist  Pt will be able to eat independently without assist from Left UE secondary to an improvement in UE strength

## 2025-07-30 ENCOUNTER — TREATMENT (OUTPATIENT)
Dept: PHYSICAL THERAPY | Facility: CLINIC | Age: 85
End: 2025-07-30
Payer: MEDICARE

## 2025-07-30 DIAGNOSIS — S42.254D CLOSED NONDISPLACED FRACTURE OF GREATER TUBEROSITY OF RIGHT HUMERUS WITH ROUTINE HEALING, SUBSEQUENT ENCOUNTER: Primary | ICD-10-CM

## 2025-07-30 DIAGNOSIS — M54.16 LUMBAR RADICULOPATHY: ICD-10-CM

## 2025-07-30 PROCEDURE — 97110 THERAPEUTIC EXERCISES: CPT | Mod: GP,CQ

## 2025-07-30 PROCEDURE — 97140 MANUAL THERAPY 1/> REGIONS: CPT | Mod: GP,CQ

## 2025-08-05 ENCOUNTER — HOSPITAL ENCOUNTER (OUTPATIENT)
Dept: RADIOLOGY | Facility: CLINIC | Age: 85
Discharge: HOME | End: 2025-08-05
Payer: MEDICARE

## 2025-08-05 ENCOUNTER — OFFICE VISIT (OUTPATIENT)
Dept: URGENT CARE | Age: 85
End: 2025-08-05
Payer: MEDICARE

## 2025-08-05 VITALS
RESPIRATION RATE: 16 BRPM | TEMPERATURE: 97.9 F | WEIGHT: 160 LBS | DIASTOLIC BLOOD PRESSURE: 84 MMHG | HEIGHT: 60 IN | SYSTOLIC BLOOD PRESSURE: 138 MMHG | BODY MASS INDEX: 31.41 KG/M2 | OXYGEN SATURATION: 93 % | HEART RATE: 73 BPM

## 2025-08-05 DIAGNOSIS — M79.672 FOOT PAIN, LEFT: Primary | ICD-10-CM

## 2025-08-05 DIAGNOSIS — S92.215A CLOSED NONDISPLACED FRACTURE OF CUBOID OF LEFT FOOT, INITIAL ENCOUNTER: ICD-10-CM

## 2025-08-05 DIAGNOSIS — M79.672 FOOT PAIN, LEFT: ICD-10-CM

## 2025-08-05 DIAGNOSIS — M19.072 PRIMARY OSTEOARTHRITIS OF LEFT FOOT: ICD-10-CM

## 2025-08-05 PROCEDURE — L4361 PNEUMA/VAC WALK BOOT PRE OTS: HCPCS | Performed by: SPECIALIST

## 2025-08-05 PROCEDURE — 73630 X-RAY EXAM OF FOOT: CPT | Mod: LT

## 2025-08-05 PROCEDURE — 99214 OFFICE O/P EST MOD 30 MIN: CPT | Performed by: SPECIALIST

## 2025-08-05 PROCEDURE — 1160F RVW MEDS BY RX/DR IN RCRD: CPT | Performed by: SPECIALIST

## 2025-08-05 PROCEDURE — 3075F SYST BP GE 130 - 139MM HG: CPT | Performed by: SPECIALIST

## 2025-08-05 PROCEDURE — 1159F MED LIST DOCD IN RCRD: CPT | Performed by: SPECIALIST

## 2025-08-05 PROCEDURE — 73630 X-RAY EXAM OF FOOT: CPT | Mod: LEFT SIDE | Performed by: RADIOLOGY

## 2025-08-05 PROCEDURE — 1125F AMNT PAIN NOTED PAIN PRSNT: CPT | Performed by: SPECIALIST

## 2025-08-05 PROCEDURE — 3079F DIAST BP 80-89 MM HG: CPT | Performed by: SPECIALIST

## 2025-08-05 PROCEDURE — L3260 AMBULATORY SURGICAL BOOT EAC: HCPCS | Performed by: SPECIALIST

## 2025-08-05 ASSESSMENT — ENCOUNTER SYMPTOMS
ARTHRALGIAS: 1
CONSTITUTIONAL NEGATIVE: 1

## 2025-08-05 ASSESSMENT — PAIN SCALES - GENERAL: PAINLEVEL_OUTOF10: 8

## 2025-08-05 NOTE — PROGRESS NOTES
Subjective   Patient ID: Shila Venegas is a 85 y.o. female. They present today with a chief complaint of Foot Pain (Left foot pain since Friday, No trauma known).    History of Present Illness  HPI    Past Medical History  Allergies as of 08/05/2025 - Reviewed 08/05/2025   Allergen Reaction Noted    Ace inhibitors Unknown 07/24/2023       Prescriptions Prior to Admission[1]     Medical History[2]    Surgical History[3]     reports that she has quit smoking. Her smoking use included cigarettes. She has never used smokeless tobacco. She reports current alcohol use of about 1.0 standard drink of alcohol per week. She reports that she does not use drugs.    Review of Systems  Review of Systems   Constitutional: Negative.    Musculoskeletal:  Positive for arthralgias.                                  Objective    Vitals:    08/05/25 1103   BP: 138/84   BP Location: Left arm   Patient Position: Sitting   BP Cuff Size: Adult   Pulse: 73   Resp: 16   Temp: 36.6 °C (97.9 °F)   TempSrc: Oral   SpO2: 93%   Weight: 72.6 kg (160 lb)   Height: (!) 1.524 m (5')     No LMP recorded. Patient has had a hysterectomy.    Physical Exam  Constitutional:       Appearance: Normal appearance.     Neurological:      Mental Status: She is alert.         Procedures    Point of Care Test & Imaging Results from this visit  No results found for this visit on 08/05/25.   Imaging  No results found.    Cardiology, Vascular, and Other Imaging  No other imaging results found for the past 2 days      Diagnostic study results (if any) were reviewed by Darling Aceves MD.    Assessment/Plan   Allergies, medications, history, and pertinent labs/EKGs/Imaging reviewed by Darling Aceves MD.     Medical Decision Making   Cuboid bone fracture will follow RICE protocol, will return to  tomorrow to get a boot.and will see her Podiatrist.    Orders and Diagnoses  There are no diagnoses linked to this encounter.    Medical Admin Record      Patient  disposition: Home    Electronically signed by Darling Aceves MD  11:53 AM           [1] (Not in a hospital admission)  [2]   Past Medical History:  Diagnosis Date    Essential (primary) hypertension 02/07/2017    Benign essential hypertension    Essential (primary) hypertension 10/29/2013    Benign essential hypertension    Pain in unspecified foot 02/06/2018    Heel pain   [3]   Past Surgical History:  Procedure Laterality Date    CHOLECYSTECTOMY  04/26/2013    Cholecystectomy    COLONOSCOPY  11/03/2014    Complete Colonoscopy    HYSTERECTOMY  04/26/2013    Hysterectomy    OTHER SURGICAL HISTORY  04/26/2013    Total Abdominal Hysterectomy With Removal Of Ovary(S)    OTHER SURGICAL HISTORY  09/21/2016    Anoscopy (Therapeutic)    TOTAL ABDOMINAL HYSTERECTOMY  10/22/2013    Total Abdominal Hysterectomy

## 2025-08-05 NOTE — PATIENT INSTRUCTIONS
"R.I.C.E.    The general care of your injury includes the following: Resting, Icing, Compressing and Elevating the injured area. Remember this as \"RICE.\"    REST: Limit the use of the injured body part.  ICE: By applying ice to the affected area, swelling and pain can be reduced. Place some ice cubes in a re-sealable (Ziploc) bag and add some water. Put a thin washcloth between the bag and your skin. Apply the ice bag to the area for at least 20 minutes. Do this at least 4 times per day. Using the ice for longer times and more frequently is OK. NEVER APPLY ICE DIRECTLY TO THE SKIN.  COMPRESS: Compression means to apply pressure around the injured area such as with a splint, cast or an ace bandage.   Compression decreases swelling and improves comfort.   Compression should be tight enough to relieve swelling but not so tight as to decrease circulation. Increasing pain, numbness, tingling, or change in skin color, are all signs of decreased circulation.  ELEVATE: Elevate the injured part. For example, elevate your foot by placing it on a chair while sitting, or propping it up on pillows when lying down.     Take 2 Tylenol as needed up to 3 times a day.   I called patient at 9:20PM and advised her to return to  to get a boot.     "

## 2025-08-06 ENCOUNTER — TREATMENT (OUTPATIENT)
Dept: PHYSICAL THERAPY | Facility: CLINIC | Age: 85
End: 2025-08-06
Payer: MEDICARE

## 2025-08-06 DIAGNOSIS — M54.16 LUMBAR RADICULOPATHY: ICD-10-CM

## 2025-08-06 PROCEDURE — 97110 THERAPEUTIC EXERCISES: CPT | Mod: GP

## 2025-08-06 NOTE — PROGRESS NOTES
Physical Therapy    Physical Therapy Evaluation and Treatment    Patient Name: Shila Venegas  MRN: [id number]  Today's Date: 8/6/2025  Time Calculation  Start Time: 1425  Stop Time: 1500  Time Calculation (min): 35 min    Insurance:  Visit number: 3  Authorization info: [place] auth, [place]  Insurance Type: Payor: MEDICARE / Plan: MEDICARE PART A AND B / Product Type: *No Product type* /     Current Problem  1. Lumbar radiculopathy  Follow Up In Physical Therapy          General  85 yoF presenting to therapy for s/p closed nondisplaced fracture of greater tuberosity of Right UE with routine healing. Pt fractured humerus in 2021 after an accidental fall. She completed OT at this facility with some improvement, but her pain has returned recently. Her pain and functional limitations are impacting her iADL's, ADL's and occupation. Her PROM is better than AROM in all planes. However, her rotator cuff activation is poor. Trace amounts of strength in resisted ER at 0 (infraspinatus). Primary goal of therapy is to reduce pain and maximize functional independence.     Precautions  Osteopenia/osteoporosis  Falls risk (stress fracture Left foot)     SUBJECTIVE:   Difficult to stretch her am into ER.  Found out yesterday she has stress fractures in her Left foot. Now wearing a walking boot and using a cane. Leave for cruise on Friday    Referred for: closed nondisplaced fracture of greater tuberosity of Right UE with routine healing  Referred by: Guera Ariza PA-C  History: Accidental fall in 2021 fracturing her Right humerus. Fracture managed non-operatively. She completed OT at this facility and made good progress. 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  There is no evidence for acute fracture or dislocation. Remote healed  right-sided rib fracture is also noted.  Osteopenia is present. Hypertrophic bony changes are seen at the  acromioclavicular joint, which narrow the joint.  Narrowing of the  glenohumeral joint is seen with associated sclerosis and spur  formation.  No lytic or blastic lesions are seen.      IMPRESSION:  Remote healed fracture of the proximal humerus.  Degenerative changes.    OBJECTIVE:    Observation:    Palpation: active trigger points through infraspinatus    Shoulder AROM (unaffected UE WFL)  Flex: 90, pulling  Abd:  90, pain  Ext: 50, pain  ER at 0: lacking 10 deg  Hand behind head: unable  Hand behind back: unable  Hand across body: to acromion, pulls    PROM > AROM all planes    Glenohumeral Joint Mobility: hypomobile AP, inferior    Elbow AROM: WFL all planes bilaterally    Shoulder Strength:  Flex: 2-/5  Abd: 2-/5  ER at 0: 1/5  IR at 0: 3+/5    Elbow Strength:   Flex: 4-/5  Ext: 4-/5    Treatments:  Therapeutic Exercise: 25  Pulleys x 3 min  Seated ER stretch with  wand x 20  Seated pendulum  Supine wand flexion x 10   Supine wand press up 2# 2 x 10   Supine active press + flex 2 x 10      Manual: 10  PROM and sTM to R shoulder, UT, bicep     HEP: (I forgot to add seated wand ER to printout)  Access Code: [email or web address]  URL: [email or web address]  Date: 07/09/2025  Prepared by: Haven Malagon    Exercises  - Standing Single Arm Elbow Flexion with Resistance  - 1 x daily - 7 x weekly - 2-3 sets - 10 reps  - Standing Elbow Extension with Self-Anchored Resistance  - 1 x daily - 7 x weekly - 2-3 sets - 10 reps  - Standing Shoulder  Row with Anchored Resistance  - 1 x daily - 7 x weekly - 2-3 sets - 10 reps  - Supine Shoulder Press  - 1 x daily - 7 x weekly - 2-3 sets - 10 reps  - Supine Shoulder Flexion Extension AAROM with Dowel  - 1 x daily - 7 x weekly - 2-3 sets - 10 reps  - Standing Shoulder Internal Rotation AAROM with Dowel  - 1 x daily - 7 x weekly - 2-3 sets - 10 reps  - Standing Isometric Shoulder Internal Rotation at Doorway  - 1 x daily - 7 x weekly - 2-3 sets - 10 reps - 3 hold  - Isometric Shoulder Abduction at Wall  - 1 x daily - 7 x weekly - 2-3 sets - 10 reps - 3 hold    Response to treatment: good, no adverse reaction    ASSESSMENT:   Now able to achieve neutral in ER at 0 deg abduction.  Primary goal of therapy is to reduce pain and maximize functional independence.     PLAN:  OP PT PLAN:  Treatment/Interventions: Cryotherapy , Education/Instruction , Manual Therapy  , Neuromuscular re-education , Therapeutic activities , Therapeutic exercise  , and Vaspneumatic device therapy  PT Plan: Skilled PT   PT Frequency: 1-2 times per week  Duration:8-10 visits  Certification Period Start Date: 7/9/25  Certification Period End Date: 10/7/25  Visits Approved: OSCAR romero  Rehab Potential: Good  Plan of Care Agreement: Patient         Goals:   Pt will report minimal to no pain with all iADL's, ADL's and functional activities to allow for full participation in daily activities  AROM of affected extremity will be 75% of WFL in all planes to allow for increased participation with iADL's, ADL's, and functional mobility tasks  Strength of affected extremity will be 3/5 or greater in all planes to allow for full participation in iADL's, ADL's, and functional mobility tasks  Pt will be independent with home exercise program to allow for program progression and discharge  Outcome score reported will improve by MCID to indicate an improvement in overall function  Pt will be independent with reaching behind her head to allow her to wash her own  hair without daughter assist  Pt will be able to eat independently without assist from Left UE secondary to an improvement in UE strength

## 2025-08-18 ENCOUNTER — TREATMENT (OUTPATIENT)
Dept: PHYSICAL THERAPY | Facility: CLINIC | Age: 85
End: 2025-08-18
Payer: MEDICARE

## 2025-08-18 DIAGNOSIS — M54.16 LUMBAR RADICULOPATHY: ICD-10-CM

## 2025-08-18 DIAGNOSIS — S42.254D CLOSED NONDISPLACED FRACTURE OF GREATER TUBEROSITY OF RIGHT HUMERUS WITH ROUTINE HEALING, SUBSEQUENT ENCOUNTER: Primary | ICD-10-CM

## 2025-08-18 PROCEDURE — 97140 MANUAL THERAPY 1/> REGIONS: CPT | Mod: GP

## 2025-08-18 PROCEDURE — 97110 THERAPEUTIC EXERCISES: CPT | Mod: GP

## 2025-08-25 ENCOUNTER — TREATMENT (OUTPATIENT)
Dept: PHYSICAL THERAPY | Facility: CLINIC | Age: 85
End: 2025-08-25
Payer: MEDICARE

## 2025-08-25 DIAGNOSIS — M54.16 LUMBAR RADICULOPATHY: ICD-10-CM

## 2025-08-25 PROCEDURE — 97110 THERAPEUTIC EXERCISES: CPT | Mod: GP

## 2025-08-26 DIAGNOSIS — M81.0 OSTEOPOROSIS, UNSPECIFIED OSTEOPOROSIS TYPE, UNSPECIFIED PATHOLOGICAL FRACTURE PRESENCE: ICD-10-CM

## 2025-08-26 RX ORDER — LANOLIN ALCOHOL/MO/W.PET/CERES
1 CREAM (GRAM) TOPICAL DAILY
Qty: 90 TABLET | Refills: 0 | Status: SHIPPED | OUTPATIENT
Start: 2025-08-26

## 2025-08-27 ENCOUNTER — APPOINTMENT (OUTPATIENT)
Dept: PRIMARY CARE | Facility: CLINIC | Age: 85
End: 2025-08-27
Payer: MEDICARE

## 2025-09-02 ENCOUNTER — TREATMENT (OUTPATIENT)
Dept: PHYSICAL THERAPY | Facility: CLINIC | Age: 85
End: 2025-09-02
Payer: MEDICARE

## 2025-09-02 DIAGNOSIS — M54.16 LUMBAR RADICULOPATHY: ICD-10-CM

## 2025-09-02 PROCEDURE — 97110 THERAPEUTIC EXERCISES: CPT | Mod: GP

## 2025-11-24 ENCOUNTER — APPOINTMENT (OUTPATIENT)
Dept: OPHTHALMOLOGY | Facility: CLINIC | Age: 85
End: 2025-11-24
Payer: MEDICARE

## 2025-12-02 ENCOUNTER — APPOINTMENT (OUTPATIENT)
Dept: PRIMARY CARE | Facility: CLINIC | Age: 85
End: 2025-12-02
Payer: MEDICARE

## 2026-01-07 ENCOUNTER — APPOINTMENT (OUTPATIENT)
Dept: PRIMARY CARE | Facility: CLINIC | Age: 86
End: 2026-01-07
Payer: MEDICARE